# Patient Record
Sex: FEMALE | Race: WHITE | NOT HISPANIC OR LATINO | URBAN - METROPOLITAN AREA
[De-identification: names, ages, dates, MRNs, and addresses within clinical notes are randomized per-mention and may not be internally consistent; named-entity substitution may affect disease eponyms.]

---

## 2021-10-01 ENCOUNTER — INPATIENT (INPATIENT)
Facility: HOSPITAL | Age: 86
LOS: 5 days | Discharge: NURSING, REHAB OR LONG-TERM CARE | DRG: 689 | End: 2021-10-07
Attending: INTERNAL MEDICINE | Admitting: INTERNAL MEDICINE
Payer: MEDICAID

## 2021-10-01 VITALS
DIASTOLIC BLOOD PRESSURE: 85 MMHG | HEIGHT: 64.96 IN | WEIGHT: 132.28 LBS | SYSTOLIC BLOOD PRESSURE: 131 MMHG | TEMPERATURE: 98 F | HEART RATE: 63 BPM | RESPIRATION RATE: 18 BRPM | OXYGEN SATURATION: 95 %

## 2021-10-01 DIAGNOSIS — R41.82 ALTERED MENTAL STATUS, UNSPECIFIED: ICD-10-CM

## 2021-10-01 DIAGNOSIS — G93.40 ENCEPHALOPATHY, UNSPECIFIED: ICD-10-CM

## 2021-10-01 DIAGNOSIS — Z29.9 ENCOUNTER FOR PROPHYLACTIC MEASURES, UNSPECIFIED: ICD-10-CM

## 2021-10-01 DIAGNOSIS — N39.0 URINARY TRACT INFECTION, SITE NOT SPECIFIED: ICD-10-CM

## 2021-10-01 DIAGNOSIS — I82.90 ACUTE EMBOLISM AND THROMBOSIS OF UNSPECIFIED VEIN: ICD-10-CM

## 2021-10-01 DIAGNOSIS — I10 ESSENTIAL (PRIMARY) HYPERTENSION: ICD-10-CM

## 2021-10-01 DIAGNOSIS — F03.90 UNSPECIFIED DEMENTIA, UNSPECIFIED SEVERITY, WITHOUT BEHAVIORAL DISTURBANCE, PSYCHOTIC DISTURBANCE, MOOD DISTURBANCE, AND ANXIETY: ICD-10-CM

## 2021-10-01 DIAGNOSIS — E03.9 HYPOTHYROIDISM, UNSPECIFIED: ICD-10-CM

## 2021-10-01 LAB
ALBUMIN SERPL ELPH-MCNC: 3.1 G/DL — LOW (ref 3.5–5)
ALP SERPL-CCNC: 64 U/L — SIGNIFICANT CHANGE UP (ref 40–120)
ALT FLD-CCNC: 35 U/L DA — SIGNIFICANT CHANGE UP (ref 10–60)
ANION GAP SERPL CALC-SCNC: 9 MMOL/L — SIGNIFICANT CHANGE UP (ref 5–17)
APPEARANCE UR: CLEAR — SIGNIFICANT CHANGE UP
APTT BLD: 39.4 SEC — HIGH (ref 27.5–35.5)
AST SERPL-CCNC: 26 U/L — SIGNIFICANT CHANGE UP (ref 10–40)
BASOPHILS # BLD AUTO: 0.04 K/UL — SIGNIFICANT CHANGE UP (ref 0–0.2)
BASOPHILS NFR BLD AUTO: 0.6 % — SIGNIFICANT CHANGE UP (ref 0–2)
BILIRUB SERPL-MCNC: 0.6 MG/DL — SIGNIFICANT CHANGE UP (ref 0.2–1.2)
BILIRUB UR-MCNC: NEGATIVE — SIGNIFICANT CHANGE UP
BUN SERPL-MCNC: 31 MG/DL — HIGH (ref 7–18)
CALCIUM SERPL-MCNC: 9.2 MG/DL — SIGNIFICANT CHANGE UP (ref 8.4–10.5)
CHLORIDE SERPL-SCNC: 111 MMOL/L — HIGH (ref 96–108)
CO2 SERPL-SCNC: 24 MMOL/L — SIGNIFICANT CHANGE UP (ref 22–31)
COLOR SPEC: YELLOW — SIGNIFICANT CHANGE UP
CREAT SERPL-MCNC: 0.97 MG/DL — SIGNIFICANT CHANGE UP (ref 0.5–1.3)
DIFF PNL FLD: ABNORMAL
EOSINOPHIL # BLD AUTO: 0.01 K/UL — SIGNIFICANT CHANGE UP (ref 0–0.5)
EOSINOPHIL NFR BLD AUTO: 0.1 % — SIGNIFICANT CHANGE UP (ref 0–6)
GLUCOSE SERPL-MCNC: 116 MG/DL — HIGH (ref 70–99)
GLUCOSE UR QL: NEGATIVE — SIGNIFICANT CHANGE UP
HCT VFR BLD CALC: 35.1 % — SIGNIFICANT CHANGE UP (ref 34.5–45)
HGB BLD-MCNC: 12.1 G/DL — SIGNIFICANT CHANGE UP (ref 11.5–15.5)
IMM GRANULOCYTES NFR BLD AUTO: 0.6 % — SIGNIFICANT CHANGE UP (ref 0–1.5)
INR BLD: 1.78 RATIO — HIGH (ref 0.88–1.16)
KETONES UR-MCNC: NEGATIVE — SIGNIFICANT CHANGE UP
LACTATE SERPL-SCNC: 1 MMOL/L — SIGNIFICANT CHANGE UP (ref 0.7–2)
LEUKOCYTE ESTERASE UR-ACNC: NEGATIVE — SIGNIFICANT CHANGE UP
LYMPHOCYTES # BLD AUTO: 1.11 K/UL — SIGNIFICANT CHANGE UP (ref 1–3.3)
LYMPHOCYTES # BLD AUTO: 15.7 % — SIGNIFICANT CHANGE UP (ref 13–44)
MCHC RBC-ENTMCNC: 30.2 PG — SIGNIFICANT CHANGE UP (ref 27–34)
MCHC RBC-ENTMCNC: 34.5 GM/DL — SIGNIFICANT CHANGE UP (ref 32–36)
MCV RBC AUTO: 87.5 FL — SIGNIFICANT CHANGE UP (ref 80–100)
MONOCYTES # BLD AUTO: 0.49 K/UL — SIGNIFICANT CHANGE UP (ref 0–0.9)
MONOCYTES NFR BLD AUTO: 6.9 % — SIGNIFICANT CHANGE UP (ref 2–14)
NEUTROPHILS # BLD AUTO: 5.37 K/UL — SIGNIFICANT CHANGE UP (ref 1.8–7.4)
NEUTROPHILS NFR BLD AUTO: 76.1 % — SIGNIFICANT CHANGE UP (ref 43–77)
NITRITE UR-MCNC: POSITIVE
NRBC # BLD: 0 /100 WBCS — SIGNIFICANT CHANGE UP (ref 0–0)
PH UR: 5 — SIGNIFICANT CHANGE UP (ref 5–8)
PLATELET # BLD AUTO: 266 K/UL — SIGNIFICANT CHANGE UP (ref 150–400)
POTASSIUM SERPL-MCNC: 3.6 MMOL/L — SIGNIFICANT CHANGE UP (ref 3.5–5.3)
POTASSIUM SERPL-SCNC: 3.6 MMOL/L — SIGNIFICANT CHANGE UP (ref 3.5–5.3)
PROT SERPL-MCNC: 7.2 G/DL — SIGNIFICANT CHANGE UP (ref 6–8.3)
PROT UR-MCNC: 30 MG/DL
PROTHROM AB SERPL-ACNC: 20.6 SEC — HIGH (ref 10.6–13.6)
RAPID RVP RESULT: SIGNIFICANT CHANGE UP
RBC # BLD: 4.01 M/UL — SIGNIFICANT CHANGE UP (ref 3.8–5.2)
RBC # FLD: 12.9 % — SIGNIFICANT CHANGE UP (ref 10.3–14.5)
SARS-COV-2 RNA SPEC QL NAA+PROBE: SIGNIFICANT CHANGE UP
SODIUM SERPL-SCNC: 144 MMOL/L — SIGNIFICANT CHANGE UP (ref 135–145)
SP GR SPEC: 1.02 — SIGNIFICANT CHANGE UP (ref 1.01–1.02)
UROBILINOGEN FLD QL: NEGATIVE — SIGNIFICANT CHANGE UP
WBC # BLD: 7.06 K/UL — SIGNIFICANT CHANGE UP (ref 3.8–10.5)
WBC # FLD AUTO: 7.06 K/UL — SIGNIFICANT CHANGE UP (ref 3.8–10.5)

## 2021-10-01 PROCEDURE — G1004: CPT

## 2021-10-01 PROCEDURE — 71045 X-RAY EXAM CHEST 1 VIEW: CPT | Mod: 26

## 2021-10-01 PROCEDURE — 70450 CT HEAD/BRAIN W/O DYE: CPT | Mod: 26,MG

## 2021-10-01 PROCEDURE — 99284 EMERGENCY DEPT VISIT MOD MDM: CPT

## 2021-10-01 RX ORDER — LOSARTAN POTASSIUM 100 MG/1
25 TABLET, FILM COATED ORAL DAILY
Refills: 0 | Status: DISCONTINUED | OUTPATIENT
Start: 2021-10-01 | End: 2021-10-07

## 2021-10-01 RX ORDER — ACETAMINOPHEN 500 MG
650 TABLET ORAL EVERY 6 HOURS
Refills: 0 | Status: DISCONTINUED | OUTPATIENT
Start: 2021-10-01 | End: 2021-10-07

## 2021-10-01 RX ORDER — MEMANTINE HYDROCHLORIDE 10 MG/1
5 TABLET ORAL
Refills: 0 | Status: DISCONTINUED | OUTPATIENT
Start: 2021-10-01 | End: 2021-10-07

## 2021-10-01 RX ORDER — RIVAROXABAN 15 MG-20MG
1 KIT ORAL
Qty: 0 | Refills: 0 | DISCHARGE

## 2021-10-01 RX ORDER — PYRIDOXINE HCL (VITAMIN B6) 100 MG
100 TABLET ORAL DAILY
Refills: 0 | Status: DISCONTINUED | OUTPATIENT
Start: 2021-10-01 | End: 2021-10-07

## 2021-10-01 RX ORDER — AMLODIPINE BESYLATE 2.5 MG/1
1 TABLET ORAL
Qty: 0 | Refills: 0 | DISCHARGE

## 2021-10-01 RX ORDER — AMLODIPINE BESYLATE 2.5 MG/1
5 TABLET ORAL DAILY
Refills: 0 | Status: DISCONTINUED | OUTPATIENT
Start: 2021-10-01 | End: 2021-10-07

## 2021-10-01 RX ORDER — SIMVASTATIN 20 MG/1
20 TABLET, FILM COATED ORAL AT BEDTIME
Refills: 0 | Status: DISCONTINUED | OUTPATIENT
Start: 2021-10-01 | End: 2021-10-07

## 2021-10-01 RX ORDER — PYRIDOXINE HCL (VITAMIN B6) 100 MG
1 TABLET ORAL
Qty: 0 | Refills: 0 | DISCHARGE

## 2021-10-01 RX ORDER — MEMANTINE HYDROCHLORIDE 10 MG/1
1 TABLET ORAL
Qty: 0 | Refills: 0 | DISCHARGE

## 2021-10-01 RX ORDER — POLYETHYLENE GLYCOL 3350 17 G/17G
17 POWDER, FOR SOLUTION ORAL DAILY
Refills: 0 | Status: DISCONTINUED | OUTPATIENT
Start: 2021-10-01 | End: 2021-10-07

## 2021-10-01 RX ORDER — LEVOTHYROXINE SODIUM 125 MCG
1 TABLET ORAL
Qty: 0 | Refills: 0 | DISCHARGE

## 2021-10-01 RX ORDER — RIVAROXABAN 15 MG-20MG
20 KIT ORAL DAILY
Refills: 0 | Status: DISCONTINUED | OUTPATIENT
Start: 2021-10-01 | End: 2021-10-07

## 2021-10-01 RX ORDER — CEFTRIAXONE 500 MG/1
1000 INJECTION, POWDER, FOR SOLUTION INTRAMUSCULAR; INTRAVENOUS EVERY 24 HOURS
Refills: 0 | Status: DISCONTINUED | OUTPATIENT
Start: 2021-10-02 | End: 2021-10-06

## 2021-10-01 RX ORDER — INFLUENZA VIRUS VACCINE 15; 15; 15; 15 UG/.5ML; UG/.5ML; UG/.5ML; UG/.5ML
0.5 SUSPENSION INTRAMUSCULAR ONCE
Refills: 0 | Status: COMPLETED | OUTPATIENT
Start: 2021-10-01 | End: 2021-10-06

## 2021-10-01 RX ORDER — SIMVASTATIN 20 MG/1
1 TABLET, FILM COATED ORAL
Qty: 0 | Refills: 0 | DISCHARGE

## 2021-10-01 RX ORDER — IRBESARTAN 75 MG/1
1 TABLET ORAL
Qty: 0 | Refills: 0 | DISCHARGE

## 2021-10-01 RX ORDER — SENNA PLUS 8.6 MG/1
2 TABLET ORAL AT BEDTIME
Refills: 0 | Status: DISCONTINUED | OUTPATIENT
Start: 2021-10-01 | End: 2021-10-07

## 2021-10-01 RX ORDER — SENNA PLUS 8.6 MG/1
2 TABLET ORAL
Qty: 0 | Refills: 0 | DISCHARGE

## 2021-10-01 RX ORDER — RISPERIDONE 4 MG/1
0.25 TABLET ORAL AT BEDTIME
Refills: 0 | Status: DISCONTINUED | OUTPATIENT
Start: 2021-10-01 | End: 2021-10-07

## 2021-10-01 RX ORDER — CEFTRIAXONE 500 MG/1
1000 INJECTION, POWDER, FOR SOLUTION INTRAMUSCULAR; INTRAVENOUS ONCE
Refills: 0 | Status: COMPLETED | OUTPATIENT
Start: 2021-10-01 | End: 2021-10-01

## 2021-10-01 RX ORDER — RISPERIDONE 4 MG/1
1 TABLET ORAL
Qty: 0 | Refills: 0 | DISCHARGE

## 2021-10-01 RX ORDER — LEVOTHYROXINE SODIUM 125 MCG
25 TABLET ORAL DAILY
Refills: 0 | Status: DISCONTINUED | OUTPATIENT
Start: 2021-10-01 | End: 2021-10-07

## 2021-10-01 RX ORDER — ENOXAPARIN SODIUM 100 MG/ML
40 INJECTION SUBCUTANEOUS DAILY
Refills: 0 | Status: DISCONTINUED | OUTPATIENT
Start: 2021-10-01 | End: 2021-10-01

## 2021-10-01 RX ORDER — DOCUSATE SODIUM 100 MG
1 CAPSULE ORAL
Qty: 0 | Refills: 0 | DISCHARGE

## 2021-10-01 RX ADMIN — SENNA PLUS 2 TABLET(S): 8.6 TABLET ORAL at 21:35

## 2021-10-01 RX ADMIN — RISPERIDONE 0.25 MILLIGRAM(S): 4 TABLET ORAL at 21:35

## 2021-10-01 RX ADMIN — CEFTRIAXONE 100 MILLIGRAM(S): 500 INJECTION, POWDER, FOR SOLUTION INTRAMUSCULAR; INTRAVENOUS at 11:55

## 2021-10-01 RX ADMIN — SIMVASTATIN 20 MILLIGRAM(S): 20 TABLET, FILM COATED ORAL at 21:35

## 2021-10-01 RX ADMIN — LOSARTAN POTASSIUM 25 MILLIGRAM(S): 100 TABLET, FILM COATED ORAL at 18:50

## 2021-10-01 RX ADMIN — MEMANTINE HYDROCHLORIDE 5 MILLIGRAM(S): 10 TABLET ORAL at 18:50

## 2021-10-01 NOTE — H&P ADULT - PROBLEM SELECTOR PLAN 5
- Patient has history of dementia on memantine and risperidone at home  - C/w home meds  - Fall and aspiration precautions

## 2021-10-01 NOTE — H&P ADULT - PROBLEM SELECTOR PLAN 7
IMPROVE VTE Individual Risk Assessment  RISK                                                                Points  [  ] Previous VTE                                                  3  [  ] Thrombophilia                                               2  [  ] Lower limb paralysis                                      2        (unable to hold up >15 seconds)    [  ] Current Cancer                                              2         (within 6 months)  [  ] Immobilization > 24 hrs                                1  [  ] ICU/CCU stay > 24 hours                              1  [  ] Age > 60                                                      1  IMPROVE VTE Score ___2______    PPI for GI prophylaxis  On Xarelto full AC

## 2021-10-01 NOTE — ED ADULT NURSE NOTE - NSIMPLEMENTINTERV_GEN_ALL_ED
Implemented All Fall with Harm Risk Interventions:  [place] to call system. Call bell, personal items and telephone within reach. Instruct patient to call for assistance. Room bathroom lighting operational. Non-slip footwear when patient is off stretcher. Physically safe environment: no spills, clutter or unnecessary equipment. Stretcher in lowest position, wheels locked, appropriate side rails in place. Provide visual cue, wrist band, yellow gown, etc. Monitor gait and stability. Monitor for mental status changes and reorient to person, place, and time. Review medications for side effects contributing to fall risk. Reinforce activity limits and safety measures with patient and family. Provide visual clues: red socks.

## 2021-10-01 NOTE — H&P ADULT - PROBLEM SELECTOR PLAN 2
- Patient presented with change in mental status and UA +  - Started on Rocephin IV   - F/u Ucx and adjust medications as needed

## 2021-10-01 NOTE — H&P ADULT - NSHPPHYSICALEXAM_GEN_ALL_CORE
ICU Vital Signs Last 24 Hrs  T(C): 37.1 (01 Oct 2021 11:56), Max: 37.1 (01 Oct 2021 11:56)  T(F): 98.7 (01 Oct 2021 11:56), Max: 98.7 (01 Oct 2021 11:56)  HR: 71 (01 Oct 2021 11:56) (63 - 71)  BP: 144/85 (01 Oct 2021 11:56) (131/85 - 144/85)  RR: 17 (01 Oct 2021 11:56) (17 - 18)  SpO2: 97% (01 Oct 2021 11:56) (95% - 97%) ICU Vital Signs Last 24 Hrs  T(C): 37.1 (01 Oct 2021 11:56), Max: 37.1 (01 Oct 2021 11:56)  T(F): 98.7 (01 Oct 2021 11:56), Max: 98.7 (01 Oct 2021 11:56)  HR: 71 (01 Oct 2021 11:56) (63 - 71)  BP: 144/85 (01 Oct 2021 11:56) (131/85 - 144/85)  RR: 17 (01 Oct 2021 11:56) (17 - 18)  SpO2: 97% (01 Oct 2021 11:56) (95% - 97%)    GENERAL: NAD, sat well on RA, pale   HEAD:  Atraumatic, Normocephalic  EYES:  conjunctiva and sclera clear  NECK: Supple, No JVD, Normal thyroid  CHEST/LUNG: Clear to auscultation. Clear to percussion bilaterally; No rales, rhonchi, wheezing, or rubs  HEART: Regular rate and rhythm; No murmurs, rubs, or gallops  ABDOMEN: Soft, Nontender, Nondistended; Bowel sounds present, no pain or masses on palpation   NERVOUS SYSTEM:  Alert, does not follow commands, no neuro deficits   EXTREMITIES:  2+ Peripheral Pulses, No clubbing, cyanosis, or edema  : voiding well   SKIN: warm, dry

## 2021-10-01 NOTE — ED PROVIDER NOTE - CARE PLAN
Principal Discharge DX:	Altered mental status  Secondary Diagnosis:	UTI (urinary tract infection), uncomplicated   1

## 2021-10-01 NOTE — H&P ADULT - ASSESSMENT
89 year old female has past medical hx of dementia, HLD, hypothyroidism, hypertension, PE on xarelto was BIBEMS as patient was having change in mental status for one day admitted to medicine for Acute encephalopathy 2/2 UTI

## 2021-10-01 NOTE — H&P ADULT - NSICDXPASTMEDICALHX_GEN_ALL_CORE_FT
PAST MEDICAL HISTORY:  Dementia     Hyperlipidemia     Hypertension     Hypothyroidism      PAST MEDICAL HISTORY:  Dementia     Hyperlipidemia     Hypertension     Hypothyroidism     VTE (venous thromboembolism) on Xarelto

## 2021-10-01 NOTE — H&P ADULT - NSHPREVIEWOFSYSTEMS_GEN_ALL_CORE
REVIEW OF SYSTEMS:  CONSTITUTIONAL: No fever, weight loss, or fatigue  RESPIRATORY: No cough, wheezing, chills or hemoptysis; No shortness of breath  CARDIOVASCULAR: No chest pain, palpitations, dizziness, or leg swelling  GASTROINTESTINAL: No abdominal pain. No nausea, vomiting, or hematemesis; No diarrhea or constipation. No melena or hematochezia.  GENITOURINARY: No dysuria or hematuria, urinary frequency  NEUROLOGICAL: No headaches, memory loss, loss of strength, numbness, or tremors  SKIN: No itching, burning, rashes, or lesions same as above

## 2021-10-01 NOTE — H&P ADULT - CONVERSATION DETAILS
Spoke to daughter regarding GOC. Daughter Radha referred she does not want her mother to suffer and when her time comes then let her go in peace. She agreed to DNR DNI.

## 2021-10-01 NOTE — ED PROVIDER NOTE - OBJECTIVE STATEMENT
89 y/o female with PMHx of HTN, HLD, hypothyroid, and dementia presents with worsening mental status.  As per daughter whom is at bedside, the patient has been more aggressive for the past week and sometimes does not recognize daughter.  pt had lab work done yesterday and was found to have a UTI.  pt sent for admission for possible uro sepsis.

## 2021-10-01 NOTE — ED PROVIDER NOTE - CLINICAL SUMMARY MEDICAL DECISION MAKING FREE TEXT BOX
pt sent to ED for worsening mental status.  pt with baseline dementia.  Will check labs, UA, head CT, and reassess.

## 2021-10-01 NOTE — H&P ADULT - PROBLEM SELECTOR PLAN 1
- Patient admitted for acute encephalopathy most likely 2/2 UTI vs worsening dementia   - CT Head showed No hydrocephalus, acute intracranial hemorrhage, mass effect, or brain edema. 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  - Fall precautions/Aspiration precautions  - Will start Rocephin for UTI  - F/u Bcx, Ucx  - Monitor for mental status changes

## 2021-10-01 NOTE — H&P ADULT - HISTORY OF PRESENT ILLNESS
89 year old female from home, having difficulty ambulating at home, has past medical hx of dementia, hypothyroidism, HLD, hypertension was BIBEMS as patient was having change in mental status for one day and for positive UA concern for UTI done at home during home visit. Story obtained from daughter Radha as patient is unable to respond to questions due to dementia. As per daughter, no fevers, chills, cough, sob, chest pain or palpitations.     GOC DNR DNI  89 year old female from home, having difficulty ambulating at home, has past medical hx of dementia, hypothyroidism, HLD, hypertension, PE on xarelto was BIBEMS as patient was having change in mental status for one day and for positive UA concern for UTI done at home during home visit. Story obtained from daughter Radha as patient is unable to respond to questions due to dementia. As per daughter, no fevers, chills, cough, sob, chest pain or palpitations.     GOC DNR DNI

## 2021-10-02 LAB
A1C WITH ESTIMATED AVERAGE GLUCOSE RESULT: 5.7 % — HIGH (ref 4–5.6)
ANION GAP SERPL CALC-SCNC: 13 MMOL/L — SIGNIFICANT CHANGE UP (ref 5–17)
BUN SERPL-MCNC: 22 MG/DL — HIGH (ref 7–18)
CALCIUM SERPL-MCNC: 9.7 MG/DL — SIGNIFICANT CHANGE UP (ref 8.4–10.5)
CHLORIDE SERPL-SCNC: 108 MMOL/L — SIGNIFICANT CHANGE UP (ref 96–108)
CHOLEST SERPL-MCNC: 167 MG/DL — SIGNIFICANT CHANGE UP
CO2 SERPL-SCNC: 24 MMOL/L — SIGNIFICANT CHANGE UP (ref 22–31)
COVID-19 SPIKE DOMAIN AB INTERP: POSITIVE
COVID-19 SPIKE DOMAIN ANTIBODY RESULT: 200 U/ML — HIGH
CREAT SERPL-MCNC: 0.98 MG/DL — SIGNIFICANT CHANGE UP (ref 0.5–1.3)
ESTIMATED AVERAGE GLUCOSE: 117 MG/DL — HIGH (ref 68–114)
GLUCOSE SERPL-MCNC: 120 MG/DL — HIGH (ref 70–99)
HCT VFR BLD CALC: 38.4 % — SIGNIFICANT CHANGE UP (ref 34.5–45)
HDLC SERPL-MCNC: 54 MG/DL — SIGNIFICANT CHANGE UP
HGB BLD-MCNC: 13.3 G/DL — SIGNIFICANT CHANGE UP (ref 11.5–15.5)
LIPID PNL WITH DIRECT LDL SERPL: 88 MG/DL — SIGNIFICANT CHANGE UP
MAGNESIUM SERPL-MCNC: 1.9 MG/DL — SIGNIFICANT CHANGE UP (ref 1.6–2.6)
MCHC RBC-ENTMCNC: 29.4 PG — SIGNIFICANT CHANGE UP (ref 27–34)
MCHC RBC-ENTMCNC: 34.6 GM/DL — SIGNIFICANT CHANGE UP (ref 32–36)
MCV RBC AUTO: 85 FL — SIGNIFICANT CHANGE UP (ref 80–100)
NON HDL CHOLESTEROL: 113 MG/DL — SIGNIFICANT CHANGE UP
NRBC # BLD: 0 /100 WBCS — SIGNIFICANT CHANGE UP (ref 0–0)
PHOSPHATE SERPL-MCNC: 3.5 MG/DL — SIGNIFICANT CHANGE UP (ref 2.5–4.5)
PLATELET # BLD AUTO: 307 K/UL — SIGNIFICANT CHANGE UP (ref 150–400)
POTASSIUM SERPL-MCNC: 3.3 MMOL/L — LOW (ref 3.5–5.3)
POTASSIUM SERPL-SCNC: 3.3 MMOL/L — LOW (ref 3.5–5.3)
RBC # BLD: 4.52 M/UL — SIGNIFICANT CHANGE UP (ref 3.8–5.2)
RBC # FLD: 12.6 % — SIGNIFICANT CHANGE UP (ref 10.3–14.5)
SARS-COV-2 IGG+IGM SERPL QL IA: 200 U/ML — HIGH
SARS-COV-2 IGG+IGM SERPL QL IA: POSITIVE
SODIUM SERPL-SCNC: 145 MMOL/L — SIGNIFICANT CHANGE UP (ref 135–145)
TRIGL SERPL-MCNC: 125 MG/DL — SIGNIFICANT CHANGE UP
TSH SERPL-MCNC: 4.06 UU/ML — SIGNIFICANT CHANGE UP (ref 0.34–4.82)
WBC # BLD: 7.35 K/UL — SIGNIFICANT CHANGE UP (ref 3.8–10.5)
WBC # FLD AUTO: 7.35 K/UL — SIGNIFICANT CHANGE UP (ref 3.8–10.5)

## 2021-10-02 RX ORDER — POTASSIUM CHLORIDE 20 MEQ
40 PACKET (EA) ORAL ONCE
Refills: 0 | Status: COMPLETED | OUTPATIENT
Start: 2021-10-02 | End: 2021-10-02

## 2021-10-02 RX ADMIN — MEMANTINE HYDROCHLORIDE 5 MILLIGRAM(S): 10 TABLET ORAL at 05:46

## 2021-10-02 RX ADMIN — POLYETHYLENE GLYCOL 3350 17 GRAM(S): 17 POWDER, FOR SOLUTION ORAL at 11:33

## 2021-10-02 RX ADMIN — CEFTRIAXONE 100 MILLIGRAM(S): 500 INJECTION, POWDER, FOR SOLUTION INTRAMUSCULAR; INTRAVENOUS at 12:13

## 2021-10-02 RX ADMIN — RISPERIDONE 0.25 MILLIGRAM(S): 4 TABLET ORAL at 21:21

## 2021-10-02 RX ADMIN — SENNA PLUS 2 TABLET(S): 8.6 TABLET ORAL at 21:21

## 2021-10-02 RX ADMIN — AMLODIPINE BESYLATE 5 MILLIGRAM(S): 2.5 TABLET ORAL at 05:46

## 2021-10-02 RX ADMIN — RIVAROXABAN 20 MILLIGRAM(S): KIT at 11:32

## 2021-10-02 RX ADMIN — SIMVASTATIN 20 MILLIGRAM(S): 20 TABLET, FILM COATED ORAL at 21:21

## 2021-10-02 RX ADMIN — LOSARTAN POTASSIUM 25 MILLIGRAM(S): 100 TABLET, FILM COATED ORAL at 05:46

## 2021-10-02 RX ADMIN — Medication 100 MILLIGRAM(S): at 11:32

## 2021-10-02 RX ADMIN — MEMANTINE HYDROCHLORIDE 5 MILLIGRAM(S): 10 TABLET ORAL at 17:10

## 2021-10-02 RX ADMIN — Medication 25 MICROGRAM(S): at 05:46

## 2021-10-02 RX ADMIN — Medication 40 MILLIEQUIVALENT(S): at 11:32

## 2021-10-02 NOTE — PROGRESS NOTE ADULT - SUBJECTIVE AND OBJECTIVE BOX
INTERVAL HPI/OVERNIGHT EVENTS:  Patient seen,no acute dystress  VITAL SIGNS:  T(F): 97.7 (10-02-21 @ 14:24)  HR: 74 (10-02-21 @ 14:24)  BP: 138/85 (10-02-21 @ 14:24)  RR: 18 (10-02-21 @ 14:24)  SpO2: 96% (10-02-21 @ 14:24)  Wt(kg): --    PHYSICAL EXAM:  awake  Constitutional:  Eyes:  ENMT:perrla  Neck:  Respiratory:few rales  Cardiovascular:s1s2,m-none  Gastrointestinal:soft,bs pos  Extremities:  Vascular:  Neurological:no focal deficit  Musculoskeletal:    MEDICATIONS  (STANDING):  amLODIPine   Tablet 5 milliGRAM(s) Oral daily  cefTRIAXone   IVPB 1000 milliGRAM(s) IV Intermittent every 24 hours  influenza   Vaccine 0.5 milliLiter(s) IntraMuscular once  levothyroxine 25 MICROGram(s) Oral daily  losartan 25 milliGRAM(s) Oral daily  memantine 5 milliGRAM(s) Oral two times a day  polyethylene glycol 3350 17 Gram(s) Oral daily  pyridoxine 100 milliGRAM(s) Oral daily  risperiDONE   Tablet 0.25 milliGRAM(s) Oral at bedtime  rivaroxaban 20 milliGRAM(s) Oral daily  senna 2 Tablet(s) Oral at bedtime  simvastatin 20 milliGRAM(s) Oral at bedtime    MEDICATIONS  (PRN):  acetaminophen   Tablet .. 650 milliGRAM(s) Oral every 6 hours PRN Temp greater or equal to 38C (100.4F), Mild Pain (1 - 3)      Allergies    No Known Allergies    Intolerances        LABS:                        13.3   7.35  )-----------( 307      ( 02 Oct 2021 07:17 )             38.4     10-    145  |  108  |  22<H>  ----------------------------<  120<H>  3.3<L>   |  24  |  0.98    Ca    9.7      02 Oct 2021 07:17  Phos  3.5     10-  Mg     1.9     10-    TPro  7.2  /  Alb  3.1<L>  /  TBili  0.6  /  DBili  x   /  AST  26  /  ALT  35  /  AlkPhos  64  10-    PT/INR - ( 01 Oct 2021 11:02 )   PT: 20.6 sec;   INR: 1.78 ratio         PTT - ( 01 Oct 2021 11:02 )  PTT:39.4 sec  Urinalysis Basic - ( 01 Oct 2021 11:02 )    Color: Yellow / Appearance: Clear / S.020 / pH: x  Gluc: x / Ketone: Negative  / Bili: Negative / Urobili: Negative   Blood: x / Protein: 30 mg/dL / Nitrite: Positive   Leuk Esterase: Negative / RBC: 2-5 /HPF / WBC 6-10 /HPF   Sq Epi: x / Non Sq Epi: x / Bacteria: TNTC /HPF        RADIOLOGY & ADDITIONAL TESTS:      Assessment and Plan:    Assessment:  · Assessment	  89 year old female has past medical hx of dementia, HLD, hypothyroidism, hypertension, PE on xarelto was BIBEMS as patient was having change in mental status for one day admitted to medicine for Acute encephalopathy 2/2 UTI       Problem/Plan - 1:  ·  Problem: Acute encephalopathy   ·  Plan: - Patient admitted for acute encephalopathy most likely 2/2 UTI vs worsening dementia   - Fall precautions/Aspiration precautions  - Will start Rocephin for UTI  - F/u Bcx, Ucx  - Monitor for mental status changes.     Problem/Plan - 2:  ·  Problem: Acute UTI.   ·  Plan: - Patient presented with change in mental status and UA +  - Started on Rocephin IV   - F/u Ucx and adjust medications as needed.     Problem/Plan - 3:  ·  Problem: Hypothyroidism.   ·  Plan: - Patient with hx of hypothyroidism on Synthroid at home   - C/w home meds  - F/u TSH and adjust medications if needed.     Problem/Plan - 4:  ·  Problem: Hypertension.   ·  Plan: - Patient has history of Hypertension on Irbesartan and amlodipine at home   - C/w home meds with parameters- losartan is equivalent to Irbesartan   - DASH diet  - Monitor BP and adjust meds as needed.     Problem/Plan - 5:  ·  Problem: Dementia.   ·  Plan: - Patient has history of dementia on memantine and risperidone at home  - C/w home meds  - Fall and aspiration precautions.     Problem/Plan - 6:  ·  Problem: VTE (venous thromboembolism).   ·  Plan: - Patient has hx of PE on Xarelto at home  - C/w home meds.     Problem/Plan - 7:  ·  Problem: Prophylactic measure.   ·  Plan: IMPROVE VTE Individual Risk Assessment  RISK                                                                Points  [  ] Previous VTE                                                  3  [  ] Thrombophilia                                               2  [  ] Lower limb paralysis                                      2        (unable to hold up >15 seconds)    [  ] Current Cancer                                              2         (within 6 months)  [  ] Immobilization > 24 hrs                                1  [  ] ICU/CCU stay > 24 hours                              1  [  ] Age > 60                                                      1  IMPROVE VTE Score ___2______    PPI for GI prophylaxis  On Xarelto full AC.

## 2021-10-02 NOTE — PHYSICAL THERAPY INITIAL EVALUATION ADULT - GAIT DEVIATIONS NOTED, PT EVAL
decreased kushal/increased time in double stance/decreased velocity of limb motion/decreased step length

## 2021-10-02 NOTE — PHYSICAL THERAPY INITIAL EVALUATION ADULT - PERTINENT HX OF CURRENT PROBLEM, REHAB EVAL
Pt admitted from home for evaluation of AMS and difficulty walking. head CT negative for acute findings

## 2021-10-02 NOTE — PHYSICAL THERAPY INITIAL EVALUATION ADULT - CRITERIA FOR SKILLED THERAPEUTIC INTERVENTIONS
PAO pending progress and tolerance to PT/impairments found/functional limitations in following categories/risk reduction/prevention/anticipated discharge recommendation

## 2021-10-02 NOTE — PHYSICAL THERAPY INITIAL EVALUATION ADULT - GENERAL OBSERVATIONS, REHAB EVAL
Pt seen supine in bed, denied any c/o pain/discomfort, was cooperative during assessment. [place]  #[phone] assisted with translation

## 2021-10-03 LAB
ANION GAP SERPL CALC-SCNC: 9 MMOL/L — SIGNIFICANT CHANGE UP (ref 5–17)
BUN SERPL-MCNC: 36 MG/DL — HIGH (ref 7–18)
CALCIUM SERPL-MCNC: 9.5 MG/DL — SIGNIFICANT CHANGE UP (ref 8.4–10.5)
CHLORIDE SERPL-SCNC: 110 MMOL/L — HIGH (ref 96–108)
CO2 SERPL-SCNC: 26 MMOL/L — SIGNIFICANT CHANGE UP (ref 22–31)
CREAT SERPL-MCNC: 1.21 MG/DL — SIGNIFICANT CHANGE UP (ref 0.5–1.3)
GLUCOSE SERPL-MCNC: 123 MG/DL — HIGH (ref 70–99)
HCT VFR BLD CALC: 33.6 % — LOW (ref 34.5–45)
HGB BLD-MCNC: 12 G/DL — SIGNIFICANT CHANGE UP (ref 11.5–15.5)
MAGNESIUM SERPL-MCNC: 2.1 MG/DL — SIGNIFICANT CHANGE UP (ref 1.6–2.6)
MCHC RBC-ENTMCNC: 30.8 PG — SIGNIFICANT CHANGE UP (ref 27–34)
MCHC RBC-ENTMCNC: 35.7 GM/DL — SIGNIFICANT CHANGE UP (ref 32–36)
MCV RBC AUTO: 86.2 FL — SIGNIFICANT CHANGE UP (ref 80–100)
NRBC # BLD: 0 /100 WBCS — SIGNIFICANT CHANGE UP (ref 0–0)
PHOSPHATE SERPL-MCNC: 4.8 MG/DL — HIGH (ref 2.5–4.5)
PLATELET # BLD AUTO: 268 K/UL — SIGNIFICANT CHANGE UP (ref 150–400)
POTASSIUM SERPL-MCNC: 3.5 MMOL/L — SIGNIFICANT CHANGE UP (ref 3.5–5.3)
POTASSIUM SERPL-SCNC: 3.5 MMOL/L — SIGNIFICANT CHANGE UP (ref 3.5–5.3)
RBC # BLD: 3.9 M/UL — SIGNIFICANT CHANGE UP (ref 3.8–5.2)
RBC # FLD: 12.9 % — SIGNIFICANT CHANGE UP (ref 10.3–14.5)
SODIUM SERPL-SCNC: 145 MMOL/L — SIGNIFICANT CHANGE UP (ref 135–145)
WBC # BLD: 6.81 K/UL — SIGNIFICANT CHANGE UP (ref 3.8–10.5)
WBC # FLD AUTO: 6.81 K/UL — SIGNIFICANT CHANGE UP (ref 3.8–10.5)

## 2021-10-03 RX ADMIN — RISPERIDONE 0.25 MILLIGRAM(S): 4 TABLET ORAL at 21:55

## 2021-10-03 RX ADMIN — MEMANTINE HYDROCHLORIDE 5 MILLIGRAM(S): 10 TABLET ORAL at 05:47

## 2021-10-03 RX ADMIN — CEFTRIAXONE 100 MILLIGRAM(S): 500 INJECTION, POWDER, FOR SOLUTION INTRAMUSCULAR; INTRAVENOUS at 12:13

## 2021-10-03 RX ADMIN — MEMANTINE HYDROCHLORIDE 5 MILLIGRAM(S): 10 TABLET ORAL at 17:13

## 2021-10-03 RX ADMIN — SENNA PLUS 2 TABLET(S): 8.6 TABLET ORAL at 21:55

## 2021-10-03 RX ADMIN — LOSARTAN POTASSIUM 25 MILLIGRAM(S): 100 TABLET, FILM COATED ORAL at 05:47

## 2021-10-03 RX ADMIN — RIVAROXABAN 20 MILLIGRAM(S): KIT at 11:14

## 2021-10-03 RX ADMIN — SIMVASTATIN 20 MILLIGRAM(S): 20 TABLET, FILM COATED ORAL at 21:56

## 2021-10-03 RX ADMIN — POLYETHYLENE GLYCOL 3350 17 GRAM(S): 17 POWDER, FOR SOLUTION ORAL at 11:14

## 2021-10-03 RX ADMIN — Medication 100 MILLIGRAM(S): at 11:14

## 2021-10-03 RX ADMIN — Medication 25 MICROGRAM(S): at 05:47

## 2021-10-03 RX ADMIN — AMLODIPINE BESYLATE 5 MILLIGRAM(S): 2.5 TABLET ORAL at 05:47

## 2021-10-03 NOTE — PROGRESS NOTE ADULT - SUBJECTIVE AND OBJECTIVE BOX
INTERVAL HPI/OVERNIGHT EVENTS:  Patient seen,events noticed,awake,confused  VITAL SIGNS:  T(F): 97.3 (10-03-21 @ 06:33)  HR: 94 (10-03-21 @ 06:33)  BP: 113/76 (10-03-21 @ 06:33)  RR: 19 (10-03-21 @ 06:33)  SpO2: 97% (10-03-21 @ 06:33)  Wt(kg): --    PHYSICAL EXAM:  awake  Constitutional:  Eyes:  ENMT:perrla  Neck:  Respiratory:clear  Cardiovascular:s1s2,m-none  Gastrointestinal:soft,bs pos  Extremities:  Vascular:  Neurological:no focal deficit  Musculoskeletal:    MEDICATIONS  (STANDING):  amLODIPine   Tablet 5 milliGRAM(s) Oral daily  cefTRIAXone   IVPB 1000 milliGRAM(s) IV Intermittent every 24 hours  influenza   Vaccine 0.5 milliLiter(s) IntraMuscular once  levothyroxine 25 MICROGram(s) Oral daily  losartan 25 milliGRAM(s) Oral daily  memantine 5 milliGRAM(s) Oral two times a day  polyethylene glycol 3350 17 Gram(s) Oral daily  pyridoxine 100 milliGRAM(s) Oral daily  risperiDONE   Tablet 0.25 milliGRAM(s) Oral at bedtime  rivaroxaban 20 milliGRAM(s) Oral daily  senna 2 Tablet(s) Oral at bedtime  simvastatin 20 milliGRAM(s) Oral at bedtime    MEDICATIONS  (PRN):  acetaminophen   Tablet .. 650 milliGRAM(s) Oral every 6 hours PRN Temp greater or equal to 38C (100.4F), Mild Pain (1 - 3)      Allergies    No Known Allergies    Intolerances        LABS:                        12.0   6.81  )-----------( 268      ( 03 Oct 2021 07:27 )             33.6     10-03    145  |  110<H>  |  36<H>  ----------------------------<  123<H>  3.5   |  26  |  1.21    Ca    9.5      03 Oct 2021 07:27  Phos  4.8     10-03  Mg     2.1     10-03            RADIOLOGY & ADDITIONAL TESTS:      Assessment and Plan:    Assessment:  · Assessment	  89 year old female has past medical hx of dementia, HLD, hypothyroidism, hypertension, PE on xarelto was BIBEMS as patient was having change in mental status for one day admitted to medicine for Acute encephalopathy 2/2 UTI       Problem/Plan - 1:  ·  Problem: Acute metabolic  encephalopathy. sec to UTI  - Fall precautions/Aspiration precautions  - cont Rocephin for UTI  - F/u Bcx, Ucx  - Monitor for mental status changes.     Problem/Plan - 2:  ·  Problem: Acute UTI.   ·  Plan: - Patient presented with change in mental status and UA +  - Started on Rocephin IV   - F/u Ucx and adjust medications as needed.     Problem/Plan - 3:  ·  Problem: Hypothyroidism.   ·  Plan: - Patient with hx of hypothyroidism on Synthroid at home   - C/w home meds  - F/u TSH and adjust medications if needed.     Problem/Plan - 4:  ·  Problem: Hypertension.   ·  Plan: - Patient has history of Hypertension on Irbesartan and amlodipine at home   - C/w home meds with parameters- losartan is equivalent to Irbesartan   - DASH diet  - Monitor BP and adjust meds as needed.     Problem/Plan - 5:  ·  Problem: Dementia.   ·  Plan: - Patient has history of dementia on memantine and risperidone at home  - C/w home meds  - Fall and aspiration precautions.     Problem/Plan - 6:  ·  Problem: VTE (venous thromboembolism).   ·  Plan: - Patient has hx of PE on Xarelto at home  - C/w home meds.     Problem/Plan - 7:  ·  Problem: Prophylactic measure.   ·  Plan: IMPROVE VTE Individual Risk Assessment  RISK                                                                Points  [  ] Previous VTE                                                  3  [  ] Thrombophilia                                               2  [  ] Lower limb paralysis                                      2        (unable to hold up >15 seconds)    [  ] Current Cancer                                              2         (within 6 months)  [  ] Immobilization > 24 hrs                                1  [  ] ICU/CCU stay > 24 hours                              1  [  ] Age > 60                                                      1  IMPROVE VTE Score ___2______    PPI for GI prophylaxis  On Xarelto full AC.

## 2021-10-04 DIAGNOSIS — N39.0 URINARY TRACT INFECTION, SITE NOT SPECIFIED: ICD-10-CM

## 2021-10-04 DIAGNOSIS — Z02.9 ENCOUNTER FOR ADMINISTRATIVE EXAMINATIONS, UNSPECIFIED: ICD-10-CM

## 2021-10-04 LAB
-  AMIKACIN: SIGNIFICANT CHANGE UP
-  AMOXICILLIN/CLAVULANIC ACID: SIGNIFICANT CHANGE UP
-  AMPICILLIN/SULBACTAM: SIGNIFICANT CHANGE UP
-  AMPICILLIN: SIGNIFICANT CHANGE UP
-  AZTREONAM: SIGNIFICANT CHANGE UP
-  CEFAZOLIN: SIGNIFICANT CHANGE UP
-  CEFEPIME: SIGNIFICANT CHANGE UP
-  CEFOXITIN: SIGNIFICANT CHANGE UP
-  CEFTRIAXONE: SIGNIFICANT CHANGE UP
-  CIPROFLOXACIN: SIGNIFICANT CHANGE UP
-  ERTAPENEM: SIGNIFICANT CHANGE UP
-  GENTAMICIN: SIGNIFICANT CHANGE UP
-  IMIPENEM: SIGNIFICANT CHANGE UP
-  LEVOFLOXACIN: SIGNIFICANT CHANGE UP
-  MEROPENEM: SIGNIFICANT CHANGE UP
-  NITROFURANTOIN: SIGNIFICANT CHANGE UP
-  PIPERACILLIN/TAZOBACTAM: SIGNIFICANT CHANGE UP
-  TIGECYCLINE: SIGNIFICANT CHANGE UP
-  TOBRAMYCIN: SIGNIFICANT CHANGE UP
-  TRIMETHOPRIM/SULFAMETHOXAZOLE: SIGNIFICANT CHANGE UP
ANION GAP SERPL CALC-SCNC: 11 MMOL/L — SIGNIFICANT CHANGE UP (ref 5–17)
BUN SERPL-MCNC: 40 MG/DL — HIGH (ref 7–18)
CALCIUM SERPL-MCNC: 9.1 MG/DL — SIGNIFICANT CHANGE UP (ref 8.4–10.5)
CHLORIDE SERPL-SCNC: 108 MMOL/L — SIGNIFICANT CHANGE UP (ref 96–108)
CO2 SERPL-SCNC: 26 MMOL/L — SIGNIFICANT CHANGE UP (ref 22–31)
CREAT SERPL-MCNC: 1.11 MG/DL — SIGNIFICANT CHANGE UP (ref 0.5–1.3)
CULTURE RESULTS: SIGNIFICANT CHANGE UP
GLUCOSE SERPL-MCNC: 137 MG/DL — HIGH (ref 70–99)
HCT VFR BLD CALC: 35.4 % — SIGNIFICANT CHANGE UP (ref 34.5–45)
HGB BLD-MCNC: 12.4 G/DL — SIGNIFICANT CHANGE UP (ref 11.5–15.5)
MAGNESIUM SERPL-MCNC: 1.8 MG/DL — SIGNIFICANT CHANGE UP (ref 1.6–2.6)
MCHC RBC-ENTMCNC: 30.5 PG — SIGNIFICANT CHANGE UP (ref 27–34)
MCHC RBC-ENTMCNC: 35 GM/DL — SIGNIFICANT CHANGE UP (ref 32–36)
MCV RBC AUTO: 87.2 FL — SIGNIFICANT CHANGE UP (ref 80–100)
METHOD TYPE: SIGNIFICANT CHANGE UP
NRBC # BLD: 0 /100 WBCS — SIGNIFICANT CHANGE UP (ref 0–0)
ORGANISM # SPEC MICROSCOPIC CNT: SIGNIFICANT CHANGE UP
ORGANISM # SPEC MICROSCOPIC CNT: SIGNIFICANT CHANGE UP
PHOSPHATE SERPL-MCNC: 3.7 MG/DL — SIGNIFICANT CHANGE UP (ref 2.5–4.5)
PLATELET # BLD AUTO: 300 K/UL — SIGNIFICANT CHANGE UP (ref 150–400)
POTASSIUM SERPL-MCNC: 3.5 MMOL/L — SIGNIFICANT CHANGE UP (ref 3.5–5.3)
POTASSIUM SERPL-SCNC: 3.5 MMOL/L — SIGNIFICANT CHANGE UP (ref 3.5–5.3)
RBC # BLD: 4.06 M/UL — SIGNIFICANT CHANGE UP (ref 3.8–5.2)
RBC # FLD: 13 % — SIGNIFICANT CHANGE UP (ref 10.3–14.5)
SODIUM SERPL-SCNC: 145 MMOL/L — SIGNIFICANT CHANGE UP (ref 135–145)
SPECIMEN SOURCE: SIGNIFICANT CHANGE UP
WBC # BLD: 8.58 K/UL — SIGNIFICANT CHANGE UP (ref 3.8–10.5)
WBC # FLD AUTO: 8.58 K/UL — SIGNIFICANT CHANGE UP (ref 3.8–10.5)

## 2021-10-04 RX ORDER — PANTOPRAZOLE SODIUM 20 MG/1
40 TABLET, DELAYED RELEASE ORAL
Refills: 0 | Status: DISCONTINUED | OUTPATIENT
Start: 2021-10-04 | End: 2021-10-07

## 2021-10-04 RX ADMIN — CEFTRIAXONE 100 MILLIGRAM(S): 500 INJECTION, POWDER, FOR SOLUTION INTRAMUSCULAR; INTRAVENOUS at 11:31

## 2021-10-04 RX ADMIN — Medication 25 MICROGRAM(S): at 05:20

## 2021-10-04 RX ADMIN — AMLODIPINE BESYLATE 5 MILLIGRAM(S): 2.5 TABLET ORAL at 05:20

## 2021-10-04 RX ADMIN — SIMVASTATIN 20 MILLIGRAM(S): 20 TABLET, FILM COATED ORAL at 21:50

## 2021-10-04 RX ADMIN — RIVAROXABAN 20 MILLIGRAM(S): KIT at 11:31

## 2021-10-04 RX ADMIN — SENNA PLUS 2 TABLET(S): 8.6 TABLET ORAL at 21:50

## 2021-10-04 RX ADMIN — RISPERIDONE 0.25 MILLIGRAM(S): 4 TABLET ORAL at 21:50

## 2021-10-04 RX ADMIN — MEMANTINE HYDROCHLORIDE 5 MILLIGRAM(S): 10 TABLET ORAL at 05:20

## 2021-10-04 RX ADMIN — Medication 100 MILLIGRAM(S): at 11:31

## 2021-10-04 RX ADMIN — POLYETHYLENE GLYCOL 3350 17 GRAM(S): 17 POWDER, FOR SOLUTION ORAL at 11:31

## 2021-10-04 RX ADMIN — MEMANTINE HYDROCHLORIDE 5 MILLIGRAM(S): 10 TABLET ORAL at 17:11

## 2021-10-04 RX ADMIN — LOSARTAN POTASSIUM 25 MILLIGRAM(S): 100 TABLET, FILM COATED ORAL at 05:20

## 2021-10-04 NOTE — PROGRESS NOTE ADULT - PROBLEM SELECTOR PLAN 8
Pending Urine culture sensitivities; d/w core lab final result expected at approx 10 pm tonight  PT recommend PAO   Covid negative 10/1

## 2021-10-04 NOTE — DISCHARGE NOTE PROVIDER - NSDCFUADDINST_GEN_ALL_CORE_FT
Dysphagia 3 soft diet with thin liquids   Maintain Aspiration Precautions     Activity as tolerated with assistance

## 2021-10-04 NOTE — SWALLOW BEDSIDE ASSESSMENT ADULT - SWALLOW EVAL: DIAGNOSIS
Pt spat out trials of ice chips and refused trials of mechanical soft diet and thin liquids. Functional oropharyngeal swallow on trials of nectar thick liquids and puree.

## 2021-10-04 NOTE — PROGRESS NOTE ADULT - ASSESSMENT
89 y/o F w/ Pmhx of Dementia, HLD, hypothyroidism, hypertension, PE on xarelto was BIBEMS as patient was having change in mental status for one day admitted to medicine for Acute encephalopathy 2/2 UTI; urine culture positive for Ecoli; continued on Rocephin pending sensitivities.

## 2021-10-04 NOTE — SWALLOW BEDSIDE ASSESSMENT ADULT - SWALLOW EVAL: PROGNOSIS
pt comes to ED by EMS for mechanical fall pt tripped down 6 steps pt hit back on her last step. pt is complaining of back and leg pain. pt denies any CP or dizziness prior to falling. pt denies LOC or hitting her head. pt denies being on any blood thinners or PMHX. pt VSS
[place]

## 2021-10-04 NOTE — PROGRESS NOTE ADULT - PROBLEM SELECTOR PLAN 1
- Patient admitted for acute encephalopathy most likely 2/2 UTI vs worsening dementia   - CT Head showed No hydrocephalus, acute intracranial hemorrhage, mass effect, or brain edema. 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  - Fall precautions/Aspiration precautions  - Continue rocephin  forUTI  - Prelim blood culture NGTD  - Urine cx prelim Ecoli; pending sensitivities.   - Continue to monitor mental status, wbc, vitals

## 2021-10-04 NOTE — DISCHARGE NOTE PROVIDER - NSDCCPCAREPLAN_GEN_ALL_CORE_FT
PRINCIPAL DISCHARGE DIAGNOSIS  Diagnosis: Acute UTI  Assessment and Plan of Treatment: Patient to take all antibiotics as prescribed and follow up with her healthcare provider in one week. Call for an appointment. If she develops a fever (temp > 101F), burning on urination, difficulty voiding, call your healthcare provider.      SECONDARY DISCHARGE DIAGNOSES  Diagnosis: Acute encephalopathy  Assessment and Plan of Treatment:     Diagnosis: History of pulmonary embolism  Assessment and Plan of Treatment:     Diagnosis: Hypertension  Assessment and Plan of Treatment:     Diagnosis: Acute UTI  Assessment and Plan of Treatment:     Diagnosis: UTI (urinary tract infection), uncomplicated  Assessment and Plan of Treatment:      PRINCIPAL DISCHARGE DIAGNOSIS  Diagnosis: Acute UTI  Assessment and Plan of Treatment: Patient was treated with intravenous antibiotic therapy and she completed her treatment course while hospitalized.   - She is to follow up with her healthcare provider in one week. Call for an appointment.   -If she develops a fever (temp > 101F), burning on urination, difficulty voiding, call her healthcare provider.      SECONDARY DISCHARGE DIAGNOSES  Diagnosis: Acute encephalopathy  Assessment and Plan of Treatment: due to urinary  tract infection. now at baseline with history of dementia.    Diagnosis: History of pulmonary embolism  Assessment and Plan of Treatment: -Continue patient on Xarelto  - Maintain bleeding precautions  - Patient to follow up with her PMD for continued care    Diagnosis: Hypertension  Assessment and Plan of Treatment: Continue low salt diet  Activity as tolerated.  Continue BP medications as prescribed.  Patient to follow up with her  medical doctor for routine blood pressure monitoring at your next visit.  Notify her  doctor if she  has any of the following symptoms:   Dizziness, Lightheadedness, Blurry vision, Headache, Chest pain, Shortness of breath    Diagnosis: Dementia  Assessment and Plan of Treatment: The care of individuals with dementia is varied and dependent upon the progression of the dementia. The following suggestions are intended for the person living with, or caring for, the person with dementia.Create a safe environment.Remove the locks on bathroom doors to prevent the person from accidentally locking himself or herself in.Use childproof latches on cabinets and any place where cleaning supplies, chemicals, or alcohol are kept.Keep the house free from clutter. Remove rugs or anything that might contribute to a fall.Use night lights or dim lights at night; Have a consistent nighttime routine; limit napping during the day.Monitor chewing and swallowing ability.Only give over-the-counter or prescription edicines as directed by the caregiver.SEEK MEDICAL CARE IF:New behavioral problems start uch as moodiness, aggressiveness, or seeing things that are not there (hallucinations).Any new problem with rain function such as balance, speech, or falling a lot.Problems with swallowing develop.SEEK IMMEDIATE MEDICAL CARE IF:A fever develops.New or worsened confusion and/or sleepiness develops.       Diagnosis: UTI (urinary tract infection), uncomplicated  Assessment and Plan of Treatment:     Diagnosis: Acute UTI  Assessment and Plan of Treatment:      PRINCIPAL DISCHARGE DIAGNOSIS  Diagnosis: Acute UTI  Assessment and Plan of Treatment: Patient was treated with intravenous antibiotic therapy and she completed her treatment course while hospitalized.   - She is to follow up with her healthcare provider in one week. Call for an appointment.   -If she develops a fever (temp > 101F), burning on urination, difficulty voiding, call her healthcare provider.      SECONDARY DISCHARGE DIAGNOSES  Diagnosis: Acute encephalopathy  Assessment and Plan of Treatment: due to urinary  tract infection. now at baseline with history of dementia.    Diagnosis: History of pulmonary embolism  Assessment and Plan of Treatment: -Continue patient on Xarelto  - Maintain bleeding precautions  - Patient to follow up with her PMD for continued care    Diagnosis: Hypertension  Assessment and Plan of Treatment: Continue low salt diet  Activity as tolerated.  Continue BP medications as prescribed.  Patient to follow up with her  medical doctor for routine blood pressure monitoring at your next visit.  Notify her  doctor if she  has any of the following symptoms:   Dizziness, Lightheadedness, Blurry vision, Headache, Chest pain, Shortness of breath    Diagnosis: Dementia  Assessment and Plan of Treatment: The care of individuals with dementia is varied and dependent upon the progression of the dementia. The following suggestions are intended for the person living with, or caring for, the person with dementia.Create a safe environment.Remove the locks on bathroom doors to prevent the person from accidentally locking himself or herself in.Use childproof latches on cabinets and any place where cleaning supplies, chemicals, or alcohol are kept.Keep the house free from clutter. Remove rugs or anything that might contribute to a fall.Use night lights or dim lights at night; Have a consistent nighttime routine; limit napping during the day.Monitor chewing and swallowing ability.Only give over-the-counter or prescription edicines as directed by the caregiver.SEEK MEDICAL CARE IF:New behavioral problems start uch as moodiness, aggressiveness, or seeing things that are not there (hallucinations).Any new problem with rain function such as balance, speech, or falling a lot.Problems with swallowing develop.SEEK IMMEDIATE MEDICAL CARE IF:A fever develops.New or worsened confusion and/or sleepiness develops.

## 2021-10-04 NOTE — PROGRESS NOTE ADULT - PROBLEM SELECTOR PLAN 2
- Patient presented with change in mental status and UA +, Ucx growing Ecoli   - Continue Rocephin  - Follow up final Ucx result

## 2021-10-04 NOTE — DISCHARGE NOTE PROVIDER - HOSPITAL COURSE
89 y/o F w/ Pmhx of Dementia, HLD, hypothyroidism, hypertension, PE on xarelto was BIBEMS as patient was having change in mental status for one day admitted to medicine for Acute encephalopathy 2/2 UTI; urine culture positive for Ecoli; continued on Rocephin pending sensitivities.       ***INCOMPLETE 10/3 *** 89 y/o F w/ Pmhx of Dementia, HLD, hypothyroidism, hypertension, PE on xarelto was BIBEMS as patient was having change in mental status for one day admitted to medicine for Acute encephalopathy 2/2 UTI; urine culture positive for Ecoli; sensitive to Rocephin; completed antibiotic course inpatient. Patient evaluated by PT and recommended for rehab; CM following  for D  Discussed with Attending; patient deemed cleared for discharge with follow up as advised.     ***Note that this is a brief summary of patient's hospitalization. Refer to EMR for further details*** 89 y/o F w/ Pmhx of Dementia, HLD, hypothyroidism, hypertension, PE on xarelto was BIBEMS as patient was having change in mental status for one day admitted to medicine for Acute encephalopathy 2/2 UTI; urine culture positive for Ecoli; sensitive to Rocephin; completed antibiotic course inpatient. Patient evaluated by PT and recommended for rehab; CM following.   Discussed with Attending; patient deemed cleared for discharge with follow up as advised.     ***Note that this is a brief summary of patient's hospitalization. Refer to EMR for further details***

## 2021-10-04 NOTE — SWALLOW BEDSIDE ASSESSMENT ADULT - SLP PERTINENT HISTORY OF CURRENT PROBLEM
Pt is a 89 year old female from home, having difficulty ambulating at home, has past medical hx of dementia, hypothyroidism, HLD, hypertension, PE on xarelto was BIBEMS as patient was having change in mental status for one day and for positive UA concern for UTI done at home during home visit. Story obtained from daughter Radha as patient is unable to respond to questions due to dementia. As per daughter, no fevers, chills, cough, sob, chest pain or palpitations.

## 2021-10-04 NOTE — SWALLOW BEDSIDE ASSESSMENT ADULT - SWALLOW EVAL: RECOMMENDED DIET
Unable to make diet recommendations at this time 2/2 pt refusal to take sufficient PO trials for swallow evaluation. Therefore, will defer PO diet to medical team at this time.

## 2021-10-04 NOTE — PROGRESS NOTE ADULT - SUBJECTIVE AND OBJECTIVE BOX
NP Note discussed with  Primary Attending; Dr Madsen     Patient is a 89y old  Female who presents with a chief complaint of ACUTE ENCEPHALOPATHY (03 Oct 2021 13:09)    Patient is mainly Serbian speaking, [employer], agent [id number] assisted w/ translation    INTERVAL HPI/OVERNIGHT EVENTS: Patient seen and examined at bedside. No new complaints    MEDICATIONS  (STANDING):  amLODIPine   Tablet 5 milliGRAM(s) Oral daily  cefTRIAXone   IVPB 1000 milliGRAM(s) IV Intermittent every 24 hours  influenza   Vaccine 0.5 milliLiter(s) IntraMuscular once  levothyroxine 25 MICROGram(s) Oral daily  losartan 25 milliGRAM(s) Oral daily  memantine 5 milliGRAM(s) Oral two times a day  polyethylene glycol 3350 17 Gram(s) Oral daily  pyridoxine 100 milliGRAM(s) Oral daily  risperiDONE   Tablet 0.25 milliGRAM(s) Oral at bedtime  rivaroxaban 20 milliGRAM(s) Oral daily  senna 2 Tablet(s) Oral at bedtime  simvastatin 20 milliGRAM(s) Oral at bedtime    MEDICATIONS  (PRN):  acetaminophen   Tablet .. 650 milliGRAM(s) Oral every 6 hours PRN Temp greater or equal to 38C (100.4F), Mild Pain (1 - 3)      __________________________________________________  REVIEW OF SYSTEMS:    CONSTITUTIONAL: No fever,   RESPIRATORY: No cough; No shortness of breath  CARDIOVASCULAR: No chest pain, no palpitations  GASTROINTESTINAL: No pain. No nausea or vomiting; No diarrhea   NEUROLOGICAL: No headache or numbness, no tremors  MUSCULOSKELETAL: No joint pain, no muscle pain  GENITOURINARY: no dysuria, no frequency, no hematuria   ALL OTHER  ROS negative        Vital Signs Last 24 Hrs  T(C): 36.8 (04 Oct 2021 05:13), Max: 37 (03 Oct 2021 20:37)  T(F): 98.2 (04 Oct 2021 05:13), Max: 98.6 (03 Oct 2021 20:37)  HR: 89 (04 Oct 2021 05:13) (89 - 105)  BP: 124/82 (04 Oct 2021 05:13) (124/82 - 138/87)  BP(mean): --  RR: 18 (04 Oct 2021 05:13) (18 - 18)  SpO2: 96% (04 Oct 2021 05:13) (96% - 96%)    ________________________________________________  PHYSICAL EXAM:  GENERAL: NAD  HEENT: Normocephalic; atraumatic   CHEST/LUNG: Breathing nonlabored; clear to auscultation bilaterally  HEART: +S1 +S2  regular  ABDOMEN: Soft, Nontender, Nondistended; Bowel sounds present  EXTREMITIES: + 2 bilateral radial pulses. No edema  SKIN: warm and dry; no rash  NERVOUS SYSTEM:  Awake and alert; Oriented  to place, person  ; no new deficits    _________________________________________________  LABS:                        12.4   8.58  )-----------( 300      ( 04 Oct 2021 08:00 )               35.4     10-04    145  |  108  |  40<H>  ----------------------------<  137<H>  3.5   |  26  |  1.11    Ca    9.1      04 Oct 2021 08:00  Phos  3.7     10-04  Mg     1.8     10-04          CAPILLARY BLOOD GLUCOSE            RADIOLOGY & ADDITIONAL TESTS:    Culture - Blood (10.01.21 @ 19:04)    Specimen Source: .Blood Blood-Peripheral    Culture Results:   No growth to date.    Culture - Blood (10.01.21 @ 19:04)    Specimen Source: .Blood Blood-Peripheral    Culture Results:   No growth to date.      Culture - Urine (10.01.21 @ 18:57)    Specimen Source: Clean Catch Clean Catch (Midstream)    Culture Results:   >100,000 CFU/ml Escherichia coli

## 2021-10-04 NOTE — SWALLOW BEDSIDE ASSESSMENT ADULT - SPECIFY REASON(S)
Counseling and Referral of Other Preventative  (Italic type indicates deductible and co-insurance are waived)    Patient Name: Filipe Agustin  Today's Date: 2/5/2018    Health Maintenance       Date Due Completion Date    Zoster Vaccine 12/20/2006 ---    Colonoscopy 05/07/2017 5/7/2007 (Done)    Override on 5/7/2007: Done (Done in 2007, exact date unknown--normal per patient)    Pneumococcal (65+) (2 of 2 - PPSV23) 06/28/2017 6/28/2016    Influenza Vaccine 08/01/2017 12/20/2016    Override on 12/12/2014: Done    High Dose Statin 02/05/2019 2/5/2018    Lipid Panel 06/20/2022 6/20/2017    TETANUS VACCINE 06/28/2026 6/28/2016 (Not Clinical)    Override on 6/28/2016: Not Clinically Appropriate        No orders of the defined types were placed in this encounter.    The following information is provided to all patients.  This information is to help you find resources for any of the problems found today that may be affecting your health:                Living healthy guide: www.[facility].[place].gov      Understanding Diabetes: www.diabetes.org      Eating healthy: www.cdc.gov/healthyweight      CDC home safety checklist: www.cdc.gov/steadi/patient.html      Agency on Aging: www.goea.[place].gov      Alcoholics anonymous (AA): www.aa.org      Physical Activity: www.[place].nih.gov/ci3tuik      Tobacco use: www.quitwithusla.org      Subjective assessment of current swallow function

## 2021-10-04 NOTE — SWALLOW BEDSIDE ASSESSMENT ADULT - COMMENTS
Pt refused trials Pt awake, confused, partially responsive to simple queries in Mexican ( Maira, ID#[id number]), HOB elevated to 90°.

## 2021-10-04 NOTE — DISCHARGE NOTE PROVIDER - CARE PROVIDER_API CALL
Patient is at 17w2d. Doing well. No cramping, no LOF, no VB  Some nausea, no vomiting.  Patients' weight gain, fluid intake and exercise level discussed.Vitals, fundal height , fetal position, and FHR reviewed on flowsheet.    .../70 mmHg  Wt 90.719 kg (200 lb)  LMP 10/31/2016  Past Medical History   Diagnosis Date   • Migraine    • H/O bladder infections      Patient Active Problem List    Diagnosis Date Noted   • Chlamydia infection- s/p tx- [facility] next visit 2017   • Encounter for supervision of normal first pregnancy in first trimester 2017   • Increased BMI - 40.6 2017     Lab:No results found for this or any previous visit (from the past 336 hour(s)).    Assessment: 25 year old   1  17w2d  2. Doing well  3. Size equals Dates and/or Scan  4. Weight gain: normal: Yes, excessive:No                   Plan:  1. Rediscuss diet.  2. Increase water intake   3. Continue vitamins.  4. appt with DR Ybarra  5. [facility] for chlamydia next visit  6. OB ff-up 4 weeks  
Pt here today for OB follow up, good fetal movement, denies LOF, VB. Pt c/o N/V and constipation. Has apt with Dr Ybarra on 3/23/17.   
RAMESH THAKUR  Geriatric Medicine-Internal Medicine  [address] [place] [address]  Phone: [phone]  Fax: [phone]  Follow Up Time:

## 2021-10-04 NOTE — SWALLOW BEDSIDE ASSESSMENT ADULT - CONSISTENCIES ADMINISTERED
ice chips, x1 thin liquid food from tray (carrots)/[facility] soft water, x2 cup sips/nectar thick applesauce, x2 tsp/puree

## 2021-10-04 NOTE — DISCHARGE NOTE PROVIDER - NSDCMRMEDTOKEN_GEN_ALL_CORE_FT
amLODIPine 5 mg oral tablet: 1 tab(s) orally once a day  docusate potassium 100 mg oral capsule: 1 cap(s) orally 3 times a day  irbesartan 75 mg oral tablet: 1 tab(s) orally once a day  memantine 5 mg oral tablet: 1 tab(s) orally 2 times a day  propranolol 20 mg oral tablet: 1 tab(s) orally 2 times a day  risperiDONE 0.25 mg oral tablet: 1 tab(s) orally once a day (at bedtime)  Senna 8.6 mg oral tablet: 2 tab(s) orally once a day (at bedtime), As Needed  simvastatin 20 mg oral tablet: 1 tab(s) orally once a day (at bedtime)  Synthroid 25 mcg (0.025 mg) oral tablet: 1 tab(s) orally once a day  Vitamin B6 100 mg oral tablet: 1 tab(s) orally once a day  Xarelto 20 mg oral tablet: 1 tab(s) orally once a day (in the evening)   acetaminophen 325 mg oral tablet: 2 tab(s) orally every 6 hours, As needed, Pain  amLODIPine 5 mg oral tablet: 1 tab(s) orally once a day  docusate potassium 100 mg oral capsule: 1 cap(s) orally 3 times a day  irbesartan 75 mg oral tablet: 1 tab(s) orally once a day  memantine 5 mg oral tablet: 1 tab(s) orally 2 times a day  pantoprazole 40 mg oral delayed release tablet: 1 tab(s) orally once a day (before a meal)  propranolol 20 mg oral tablet: 1 tab(s) orally 2 times a day  risperiDONE 0.25 mg oral tablet: 1 tab(s) orally once a day (at bedtime)  Senna 8.6 mg oral tablet: 2 tab(s) orally once a day (at bedtime), As Needed  simvastatin 20 mg oral tablet: 1 tab(s) orally once a day (at bedtime)  Synthroid 25 mcg (0.025 mg) oral tablet: 1 tab(s) orally once a day  Vitamin B6 100 mg oral tablet: 1 tab(s) orally once a day  Xarelto 20 mg oral tablet: 1 tab(s) orally once a day (in the evening)   acetaminophen 325 mg oral tablet: 2 tab(s) orally every 6 hours, As needed, Pain  amLODIPine 5 mg oral tablet: 1 tab(s) orally once a day  docusate potassium 100 mg oral capsule: 1 cap(s) orally 3 times a day  irbesartan 75 mg oral tablet: 1 tab(s) orally once a day  memantine 5 mg oral tablet: 1 tab(s) orally 2 times a day  pantoprazole 40 mg oral delayed release tablet: 1 tab(s) orally once a day (before a meal)  risperiDONE 0.25 mg oral tablet: 1 tab(s) orally once a day (at bedtime)  Senna 8.6 mg oral tablet: 2 tab(s) orally once a day (at bedtime), As Needed  simvastatin 20 mg oral tablet: 1 tab(s) orally once a day (at bedtime)  Synthroid 25 mcg (0.025 mg) oral tablet: 1 tab(s) orally once a day  Vitamin B6 100 mg oral tablet: 1 tab(s) orally once a day  Xarelto 20 mg oral tablet: 1 tab(s) orally once a day (in the evening)

## 2021-10-05 LAB
ANION GAP SERPL CALC-SCNC: 8 MMOL/L — SIGNIFICANT CHANGE UP (ref 5–17)
BASOPHILS # BLD AUTO: 0.04 K/UL — SIGNIFICANT CHANGE UP (ref 0–0.2)
BASOPHILS NFR BLD AUTO: 0.4 % — SIGNIFICANT CHANGE UP (ref 0–2)
BUN SERPL-MCNC: 43 MG/DL — HIGH (ref 7–18)
CALCIUM SERPL-MCNC: 9.8 MG/DL — SIGNIFICANT CHANGE UP (ref 8.4–10.5)
CHLORIDE SERPL-SCNC: 108 MMOL/L — SIGNIFICANT CHANGE UP (ref 96–108)
CO2 SERPL-SCNC: 27 MMOL/L — SIGNIFICANT CHANGE UP (ref 22–31)
CREAT SERPL-MCNC: 1.16 MG/DL — SIGNIFICANT CHANGE UP (ref 0.5–1.3)
EOSINOPHIL # BLD AUTO: 0.07 K/UL — SIGNIFICANT CHANGE UP (ref 0–0.5)
EOSINOPHIL NFR BLD AUTO: 0.6 % — SIGNIFICANT CHANGE UP (ref 0–6)
GLUCOSE SERPL-MCNC: 119 MG/DL — HIGH (ref 70–99)
HCT VFR BLD CALC: 36.4 % — SIGNIFICANT CHANGE UP (ref 34.5–45)
HGB BLD-MCNC: 12.8 G/DL — SIGNIFICANT CHANGE UP (ref 11.5–15.5)
IMM GRANULOCYTES NFR BLD AUTO: 0.6 % — SIGNIFICANT CHANGE UP (ref 0–1.5)
LYMPHOCYTES # BLD AUTO: 1.74 K/UL — SIGNIFICANT CHANGE UP (ref 1–3.3)
LYMPHOCYTES # BLD AUTO: 16.1 % — SIGNIFICANT CHANGE UP (ref 13–44)
MCHC RBC-ENTMCNC: 30.7 PG — SIGNIFICANT CHANGE UP (ref 27–34)
MCHC RBC-ENTMCNC: 35.2 GM/DL — SIGNIFICANT CHANGE UP (ref 32–36)
MCV RBC AUTO: 87.3 FL — SIGNIFICANT CHANGE UP (ref 80–100)
MONOCYTES # BLD AUTO: 0.95 K/UL — HIGH (ref 0–0.9)
MONOCYTES NFR BLD AUTO: 8.8 % — SIGNIFICANT CHANGE UP (ref 2–14)
NEUTROPHILS # BLD AUTO: 7.93 K/UL — HIGH (ref 1.8–7.4)
NEUTROPHILS NFR BLD AUTO: 73.5 % — SIGNIFICANT CHANGE UP (ref 43–77)
NRBC # BLD: 0 /100 WBCS — SIGNIFICANT CHANGE UP (ref 0–0)
PHOSPHATE SERPL-MCNC: 4.3 MG/DL — SIGNIFICANT CHANGE UP (ref 2.5–4.5)
PLATELET # BLD AUTO: 308 K/UL — SIGNIFICANT CHANGE UP (ref 150–400)
POTASSIUM SERPL-MCNC: 3.9 MMOL/L — SIGNIFICANT CHANGE UP (ref 3.5–5.3)
POTASSIUM SERPL-SCNC: 3.9 MMOL/L — SIGNIFICANT CHANGE UP (ref 3.5–5.3)
RBC # BLD: 4.17 M/UL — SIGNIFICANT CHANGE UP (ref 3.8–5.2)
RBC # FLD: 13.1 % — SIGNIFICANT CHANGE UP (ref 10.3–14.5)
SARS-COV-2 RNA SPEC QL NAA+PROBE: SIGNIFICANT CHANGE UP
SODIUM SERPL-SCNC: 143 MMOL/L — SIGNIFICANT CHANGE UP (ref 135–145)
WBC # BLD: 10.8 K/UL — HIGH (ref 3.8–10.5)
WBC # FLD AUTO: 10.8 K/UL — HIGH (ref 3.8–10.5)

## 2021-10-05 RX ADMIN — SENNA PLUS 2 TABLET(S): 8.6 TABLET ORAL at 21:43

## 2021-10-05 RX ADMIN — CEFTRIAXONE 100 MILLIGRAM(S): 500 INJECTION, POWDER, FOR SOLUTION INTRAMUSCULAR; INTRAVENOUS at 11:59

## 2021-10-05 RX ADMIN — LOSARTAN POTASSIUM 25 MILLIGRAM(S): 100 TABLET, FILM COATED ORAL at 05:48

## 2021-10-05 RX ADMIN — SIMVASTATIN 20 MILLIGRAM(S): 20 TABLET, FILM COATED ORAL at 21:43

## 2021-10-05 RX ADMIN — PANTOPRAZOLE SODIUM 40 MILLIGRAM(S): 20 TABLET, DELAYED RELEASE ORAL at 05:48

## 2021-10-05 RX ADMIN — Medication 100 MILLIGRAM(S): at 11:59

## 2021-10-05 RX ADMIN — POLYETHYLENE GLYCOL 3350 17 GRAM(S): 17 POWDER, FOR SOLUTION ORAL at 12:00

## 2021-10-05 RX ADMIN — MEMANTINE HYDROCHLORIDE 5 MILLIGRAM(S): 10 TABLET ORAL at 05:48

## 2021-10-05 RX ADMIN — RIVAROXABAN 20 MILLIGRAM(S): KIT at 14:34

## 2021-10-05 RX ADMIN — RISPERIDONE 0.25 MILLIGRAM(S): 4 TABLET ORAL at 21:43

## 2021-10-05 RX ADMIN — Medication 25 MICROGRAM(S): at 05:48

## 2021-10-05 RX ADMIN — MEMANTINE HYDROCHLORIDE 5 MILLIGRAM(S): 10 TABLET ORAL at 18:38

## 2021-10-05 RX ADMIN — AMLODIPINE BESYLATE 5 MILLIGRAM(S): 2.5 TABLET ORAL at 05:48

## 2021-10-05 NOTE — PROGRESS NOTE ADULT - ASSESSMENT
89 y/o F w/ Pmhx of Dementia, HLD, hypothyroidism, hypertension, PE on xarelto was BIBEMS as patient was having change in mental status for one day admitted to medicine for Acute encephalopathy 2/2 UTI; urine culture positive for Ecoli; continued on Rocephin pending sensitivities.  89 y/o F w/ Pmhx of Dementia, HLD, hypothyroidism, hypertension, PE on xarelto was BIBEMS as patient was having change in mental status for one day admitted to medicine for Acute encephalopathy 2/2 UTI; urine culture positive for Ecoli Rocephin D4. PT ramirez ramirez PAO, choices given to family. NCM to follow

## 2021-10-05 NOTE — PROGRESS NOTE ADULT - SUBJECTIVE AND OBJECTIVE BOX
NP Note discussed with  Primary Attending    INTERVAL HPI/OVERNIGHT EVENTS: S/P speech eval will advance diet      MEDICATIONS  (STANDING):  amLODIPine   Tablet 5 milliGRAM(s) Oral daily  cefTRIAXone   IVPB 1000 milliGRAM(s) IV Intermittent every 24 hours  influenza   Vaccine 0.5 milliLiter(s) IntraMuscular once  levothyroxine 25 MICROGram(s) Oral daily  losartan 25 milliGRAM(s) Oral daily  memantine 5 milliGRAM(s) Oral two times a day  pantoprazole    Tablet 40 milliGRAM(s) Oral before breakfast  polyethylene glycol 3350 17 Gram(s) Oral daily  pyridoxine 100 milliGRAM(s) Oral daily  risperiDONE   Tablet 0.25 milliGRAM(s) Oral at bedtime  rivaroxaban 20 milliGRAM(s) Oral daily  senna 2 Tablet(s) Oral at bedtime  simvastatin 20 milliGRAM(s) Oral at bedtime    MEDICATIONS  (PRN):  acetaminophen   Tablet .. 650 milliGRAM(s) Oral every 6 hours PRN Temp greater or equal to 38C (100.4F), Mild Pain (1 - 3)      __________________________________________________  REVIEW OF SYSTEMS:    CONSTITUTIONAL: No fever,   EYES: no acute visual disturbances  NECK: No pain or stiffness  RESPIRATORY: No cough; No shortness of breath  CARDIOVASCULAR: No chest pain, no palpitations  GASTROINTESTINAL: No pain. No nausea or vomiting; No diarrhea   NEUROLOGICAL: No headache or numbness, no tremors  MUSCULOSKELETAL: No joint pain, no muscle pain  GENITOURINARY: no dysuria, no frequency, no hesitancy  PSYCHIATRY: no depression , no anxiety  ALL OTHER  ROS negative        Vital Signs Last 24 Hrs  T(C): 36.7 (05 Oct 2021 05:31), Max: 36.8 (04 Oct 2021 21:44)  T(F): 98 (05 Oct 2021 05:31), Max: 98.2 (04 Oct 2021 21:44)  HR: 96 (05 Oct 2021 05:31) (96 - 103)  BP: 120/73 (05 Oct 2021 05:31) (115/72 - 128/85)  BP(mean): --  RR: 19 (05 Oct 2021 05:31) (18 - 19)  SpO2: 99% (05 Oct 2021 05:31) (97% - 99%)    ________________________________________________  PHYSICAL EXAM:  GENERAL: NAD soloment   HEENT: Normocephalic; atraumatic   CHEST/LUNG: Breathing nonlabored; clear to auscultation bilaterally  HEART: +S1 +S2  regular  ABDOMEN: Soft, Nontender, Nondistended; Bowel sounds present  EXTREMITIES: + 2 bilateral radial pulses. No edema  SKIN: warm and dry; no rash  NERVOUS SYSTEM:  A&ox2     _________________________________________________  LABS:                        12.8   10.80 )-----------( 308      ( 05 Oct 2021 07:56 )             36.4     10-05    143  |  108  |  43<H>  ----------------------------<  119<H>  3.9   |  27  |  1.16    Ca    9.8      05 Oct 2021 07:56  Phos  4.3     10-05  Mg     1.8     10-04          CAPILLARY BLOOD GLUCOSE            RADIOLOGY & ADDITIONAL TESTS:   < from: Xray Chest 1 View- PORTABLE-Urgent (10.01.21 @ 13:34) >    EXAM:  XR CHEST PORTABLE URGENT 1V                            PROCEDURE DATE:  10/01/2021          INTERPRETATION:  Portable chest radiograph    CLINICAL INFORMATION: Sepsis    TECHNIQUE:  Portable  AP view of the chest.    COMPARISON: No previous examinations are available for review.    FINDINGS:    The lungs are clear of airspace consolidations or effusions. No pneumothorax.    The  heart is mildly enlarged in transverse diameter. No hilar mass.     Visualized osseous structures are intact.    IMPRESSION:   No radiographic evidence of active chest disease.    --- End of Report ---    < end of copied text >  < from: CT Head No Cont (10.01.21 @ 12:36) >    EXAM:  CT BRAIN                            PROCEDURE DATE:  10/01/2021          INTERPRETATION:  Noncontrast CT of the brain.    CLINICAL INDICATION:  altered mental status    TECHNIQUE : Axial CT scanning of the brain was obtained from the skull base to the vertex without the administration of intravenous contrast. Sagittal and coronal reformats were provided.    COMPARISON: None available    FINDINGS:    No hydrocephalus, mass effect, midline shift, acute intracranial hemorrhage, or brain edema. Mild to moderate  white matter microvascular ischemic disease.    No displaced calvarial fracture.    Right posterior ethmoid sinus mucosal thickening. Small right sphenoid sinus air-fluid level. Mastoid air cells clear.    IMPRESSION:    No hydrocephalus, acute intracranial hemorrhage, mass effect, or brain edema.  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    Small air-fluid level in the right sphenoid sinus, correlate for the presence of acute sinusitis.        --- End ofReport ---    < end of copied text >    Imaging Personally Reviewed:  YES    Consultant(s) Notes Reviewed:   YES       Plan of care was discussed with patient and /or primary care giver; all questions and concerns were addressed and care was aligned with patient's wishes.     NP Note discussed with  Primary Attending    INTERVAL HPI/OVERNIGHT EVENTS: S/P speech eval plan advance diet to clears     MEDICATIONS  (STANDING):  amLODIPine   Tablet 5 milliGRAM(s) Oral daily  cefTRIAXone   IVPB 1000 milliGRAM(s) IV Intermittent every 24 hours  influenza   Vaccine 0.5 milliLiter(s) IntraMuscular once  levothyroxine 25 MICROGram(s) Oral daily  losartan 25 milliGRAM(s) Oral daily  memantine 5 milliGRAM(s) Oral two times a day  pantoprazole    Tablet 40 milliGRAM(s) Oral before breakfast  polyethylene glycol 3350 17 Gram(s) Oral daily  pyridoxine 100 milliGRAM(s) Oral daily  risperiDONE   Tablet 0.25 milliGRAM(s) Oral at bedtime  rivaroxaban 20 milliGRAM(s) Oral daily  senna 2 Tablet(s) Oral at bedtime  simvastatin 20 milliGRAM(s) Oral at bedtime    MEDICATIONS  (PRN):  acetaminophen   Tablet .. 650 milliGRAM(s) Oral every 6 hours PRN Temp greater or equal to 38C (100.4F), Mild Pain (1 - 3)      __________________________________________________  REVIEW OF SYSTEMS:  limited due to mental status       Vital Signs Last 24 Hrs  T(C): 36.7 (05 Oct 2021 05:31), Max: 36.8 (04 Oct 2021 21:44)  T(F): 98 (05 Oct 2021 05:31), Max: 98.2 (04 Oct 2021 21:44)  HR: 96 (05 Oct 2021 05:31) (96 - 103)  BP: 120/73 (05 Oct 2021 05:31) (115/72 - 128/85)  BP(mean): --  RR: 19 (05 Oct 2021 05:31) (18 - 19)  SpO2: 99% (05 Oct 2021 05:31) (97% - 99%)    ________________________________________________  PHYSICAL EXAM:  GENERAL: NAD soloment   HEENT: Normocephalic; atraumatic   CHEST/LUNG: Breathing nonlabored; clear to auscultation bilaterally  HEART: +S1 +S2  regular  ABDOMEN: Soft, Nontender, Nondistended; Bowel sounds present  EXTREMITIES: + 2 bilateral radial pulses. No edema  SKIN: warm and dry; no rash  NERVOUS SYSTEM:  A&ox2     _________________________________________________  LABS:                        12.8   10.80 )-----------( 308      ( 05 Oct 2021 07:56 )             36.4     10-05    143  |  108  |  43<H>  ----------------------------<  119<H>  3.9   |  27  |  1.16    Ca    9.8      05 Oct 2021 07:56  Phos  4.3     10-05  Mg     1.8     10-04          CAPILLARY BLOOD GLUCOSE            RADIOLOGY & ADDITIONAL TESTS:   < from: Xray Chest 1 View- PORTABLE-Urgent (10.01.21 @ 13:34) >    EXAM:  XR CHEST PORTABLE URGENT 1V                            PROCEDURE DATE:  10/01/2021          INTERPRETATION:  Portable chest radiograph    CLINICAL INFORMATION: Sepsis    TECHNIQUE:  Portable  AP view of the chest.    COMPARISON: No previous examinations are available for review.    FINDINGS:    The lungs are clear of airspace consolidations or effusions. No pneumothorax.    The  heart is mildly enlarged in transverse diameter. No hilar mass.     Visualized osseous structures are intact.    IMPRESSION:   No radiographic evidence of active chest disease.    --- End of Report ---    < end of copied text >  < from: CT Head No Cont (10.01.21 @ 12:36) >    EXAM:  CT BRAIN                            PROCEDURE DATE:  10/01/2021          INTERPRETATION:  Noncontrast CT of the brain.    CLINICAL INDICATION:  altered mental status    TECHNIQUE : Axial CT scanning of the brain was obtained from the skull base to the vertex without the administration of intravenous contrast. Sagittal and coronal reformats were provided.    COMPARISON: None available    FINDINGS:    No hydrocephalus, mass effect, midline shift, acute intracranial hemorrhage, or brain edema. Mild to moderate  white matter microvascular ischemic disease.    No displaced calvarial fracture.    Right posterior ethmoid sinus mucosal thickening. Small right sphenoid sinus air-fluid level. Mastoid air cells clear.    IMPRESSION:    No hydrocephalus, acute intracranial hemorrhage, mass effect, or brain edema.  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    Small air-fluid level in the right sphenoid sinus, correlate for the presence of acute sinusitis.        --- End ofReport ---    < end of copied text >    Imaging Personally Reviewed:  YES    Consultant(s) Notes Reviewed:   YES       Plan of care was discussed with patient and /or primary care giver; all questions and concerns were addressed and care was aligned with patient's wishes.

## 2021-10-05 NOTE — PROGRESS NOTE ADULT - PROBLEM SELECTOR PLAN 2
- Patient presented with change in mental status and UA +, Ucx growing Ecoli   - Continue Rocephin  - Follow up final Ucx result - Patient presented with change in mental status and UA +, Ucx growing Ecoli   - Continue Rocephin

## 2021-10-05 NOTE — PROGRESS NOTE ADULT - PROBLEM SELECTOR PLAN 1
- Patient admitted for acute encephalopathy most likely 2/2 UTI vs worsening dementia   - CT Head showed No hydrocephalus, acute intracranial hemorrhage, mass effect, or brain edema. 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  - Fall precautions/Aspiration precautions  - Continue rocephin  forUTI  - Prelim blood culture NGTD  - Urine cx prelim Ecoli; pending sensitivities.   - Continue to monitor mental status, wbc, vitals - Patient admitted for acute encephalopathy most likely 2/2 UTI vs worsening dementia   - CT Head showed No hydrocephalus, acute intracranial hemorrhage, mass effect, or brain edema. 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  - Fall precautions/Aspiration precautions  - Continue rocephin  forUTI  -UCX +Ecoli  - blood culture NGTD  - Continue to monitor mental status, wbc, vitals

## 2021-10-05 NOTE — PROGRESS NOTE ADULT - PROBLEM SELECTOR PLAN 8
Pending Urine culture sensitivities; d/w core lab final result expected at approx 10 pm tonight  PT recommend PAO   Covid negative 10/1 UCx +Ecoli  Continue Rocephin   PT recommend PAO   Covid negative 10/1  pending repeat 10/5   Choices given to family  NCM to follow

## 2021-10-06 DIAGNOSIS — N39.0 URINARY TRACT INFECTION, SITE NOT SPECIFIED: ICD-10-CM

## 2021-10-06 LAB
ANION GAP SERPL CALC-SCNC: 10 MMOL/L — SIGNIFICANT CHANGE UP (ref 5–17)
BUN SERPL-MCNC: 56 MG/DL — HIGH (ref 7–18)
CALCIUM SERPL-MCNC: 9.6 MG/DL — SIGNIFICANT CHANGE UP (ref 8.4–10.5)
CHLORIDE SERPL-SCNC: 107 MMOL/L — SIGNIFICANT CHANGE UP (ref 96–108)
CO2 SERPL-SCNC: 26 MMOL/L — SIGNIFICANT CHANGE UP (ref 22–31)
CREAT SERPL-MCNC: 1.26 MG/DL — SIGNIFICANT CHANGE UP (ref 0.5–1.3)
CULTURE RESULTS: SIGNIFICANT CHANGE UP
CULTURE RESULTS: SIGNIFICANT CHANGE UP
GLUCOSE SERPL-MCNC: 122 MG/DL — HIGH (ref 70–99)
HCT VFR BLD CALC: 36.3 % — SIGNIFICANT CHANGE UP (ref 34.5–45)
HGB BLD-MCNC: 12.7 G/DL — SIGNIFICANT CHANGE UP (ref 11.5–15.5)
MCHC RBC-ENTMCNC: 29.9 PG — SIGNIFICANT CHANGE UP (ref 27–34)
MCHC RBC-ENTMCNC: 35 GM/DL — SIGNIFICANT CHANGE UP (ref 32–36)
MCV RBC AUTO: 85.4 FL — SIGNIFICANT CHANGE UP (ref 80–100)
NRBC # BLD: 0 /100 WBCS — SIGNIFICANT CHANGE UP (ref 0–0)
PLATELET # BLD AUTO: 318 K/UL — SIGNIFICANT CHANGE UP (ref 150–400)
POTASSIUM SERPL-MCNC: 3.8 MMOL/L — SIGNIFICANT CHANGE UP (ref 3.5–5.3)
POTASSIUM SERPL-SCNC: 3.8 MMOL/L — SIGNIFICANT CHANGE UP (ref 3.5–5.3)
RBC # BLD: 4.25 M/UL — SIGNIFICANT CHANGE UP (ref 3.8–5.2)
RBC # FLD: 13.2 % — SIGNIFICANT CHANGE UP (ref 10.3–14.5)
SODIUM SERPL-SCNC: 143 MMOL/L — SIGNIFICANT CHANGE UP (ref 135–145)
SPECIMEN SOURCE: SIGNIFICANT CHANGE UP
SPECIMEN SOURCE: SIGNIFICANT CHANGE UP
WBC # BLD: 7.45 K/UL — SIGNIFICANT CHANGE UP (ref 3.8–10.5)
WBC # FLD AUTO: 7.45 K/UL — SIGNIFICANT CHANGE UP (ref 3.8–10.5)

## 2021-10-06 PROCEDURE — 99232 SBSQ HOSP IP/OBS MODERATE 35: CPT

## 2021-10-06 RX ORDER — PROPRANOLOL HCL 160 MG
1 CAPSULE, EXTENDED RELEASE 24HR ORAL
Qty: 0 | Refills: 0 | DISCHARGE

## 2021-10-06 RX ORDER — PANTOPRAZOLE SODIUM 20 MG/1
1 TABLET, DELAYED RELEASE ORAL
Qty: 0 | Refills: 0 | DISCHARGE
Start: 2021-10-06

## 2021-10-06 RX ORDER — ACETAMINOPHEN 500 MG
2 TABLET ORAL
Qty: 0 | Refills: 0 | DISCHARGE
Start: 2021-10-06

## 2021-10-06 RX ADMIN — SIMVASTATIN 20 MILLIGRAM(S): 20 TABLET, FILM COATED ORAL at 21:59

## 2021-10-06 RX ADMIN — Medication 25 MICROGRAM(S): at 05:39

## 2021-10-06 RX ADMIN — Medication 100 MILLIGRAM(S): at 11:26

## 2021-10-06 RX ADMIN — AMLODIPINE BESYLATE 5 MILLIGRAM(S): 2.5 TABLET ORAL at 05:39

## 2021-10-06 RX ADMIN — MEMANTINE HYDROCHLORIDE 5 MILLIGRAM(S): 10 TABLET ORAL at 17:07

## 2021-10-06 RX ADMIN — SENNA PLUS 2 TABLET(S): 8.6 TABLET ORAL at 21:58

## 2021-10-06 RX ADMIN — INFLUENZA VIRUS VACCINE 0.5 MILLILITER(S): 15; 15; 15; 15 SUSPENSION INTRAMUSCULAR at 12:23

## 2021-10-06 RX ADMIN — RISPERIDONE 0.25 MILLIGRAM(S): 4 TABLET ORAL at 21:58

## 2021-10-06 RX ADMIN — POLYETHYLENE GLYCOL 3350 17 GRAM(S): 17 POWDER, FOR SOLUTION ORAL at 11:26

## 2021-10-06 RX ADMIN — RIVAROXABAN 20 MILLIGRAM(S): KIT at 11:26

## 2021-10-06 RX ADMIN — MEMANTINE HYDROCHLORIDE 5 MILLIGRAM(S): 10 TABLET ORAL at 05:39

## 2021-10-06 RX ADMIN — LOSARTAN POTASSIUM 25 MILLIGRAM(S): 100 TABLET, FILM COATED ORAL at 05:39

## 2021-10-06 RX ADMIN — CEFTRIAXONE 100 MILLIGRAM(S): 500 INJECTION, POWDER, FOR SOLUTION INTRAMUSCULAR; INTRAVENOUS at 11:26

## 2021-10-06 RX ADMIN — PANTOPRAZOLE SODIUM 40 MILLIGRAM(S): 20 TABLET, DELAYED RELEASE ORAL at 05:39

## 2021-10-06 NOTE — PROGRESS NOTE ADULT - PROBLEM SELECTOR PLAN 3
TSH wnl  - Continue home synthroid dose

## 2021-10-06 NOTE — PROGRESS NOTE ADULT - REASON FOR ADMISSION
ACUTE ENCEPHALOPATHY

## 2021-10-06 NOTE — PROGRESS NOTE ADULT - PROBLEM SELECTOR PLAN 8
PT recommend PAO ; CM following. Accepted to [place]. Pending insurance authorization. Likely d/c tomorrow.   last Covid negative 10/5

## 2021-10-06 NOTE — PROGRESS NOTE ADULT - PROBLEM SELECTOR PLAN 4
Controlled. Patient has history of Hypertension on Irbesartan and amlodipine at home   - C/w home meds with parameters- losartan is equivalent to Irbesartan   - DASH diet  -Continue to monitor BP

## 2021-10-06 NOTE — PROGRESS NOTE ADULT - PROBLEM SELECTOR PLAN 6
hx of PE on Xarelto;   - Continue Xarelto  - Maintain bleeding precautions.

## 2021-10-06 NOTE — PROGRESS NOTE ADULT - SUBJECTIVE AND OBJECTIVE BOX
NP Note discussed with  Primary Attending; Dr Madsen    Patient is a 89y old  Female who presents with a chief complaint of ACUTE ENCEPHALOPATHY (06 Oct 2021 16:07)    Patient is mainly Maldivian speaking, language line interpretation services, Agent [phone], assisted with translation.    INTERVAL HPI/OVERNIGHT EVENTS: Patient seen and examined at bedside. A&Ox1-2 in no acute distress.     MEDICATIONS  (STANDING):  amLODIPine   Tablet 5 milliGRAM(s) Oral daily  cefTRIAXone   IVPB 1000 milliGRAM(s) IV Intermittent every 24 hours  levothyroxine 25 MICROGram(s) Oral daily  losartan 25 milliGRAM(s) Oral daily  memantine 5 milliGRAM(s) Oral two times a day  pantoprazole    Tablet 40 milliGRAM(s) Oral before breakfast  polyethylene glycol 3350 17 Gram(s) Oral daily  pyridoxine 100 milliGRAM(s) Oral daily  risperiDONE   Tablet 0.25 milliGRAM(s) Oral at bedtime  rivaroxaban 20 milliGRAM(s) Oral daily  senna 2 Tablet(s) Oral at bedtime  simvastatin 20 milliGRAM(s) Oral at bedtime    MEDICATIONS  (PRN):  acetaminophen   Tablet .. 650 milliGRAM(s) Oral every 6 hours PRN Temp greater or equal to 38C (100.4F), Mild Pain (1 - 3)      __________________________________________________  REVIEW OF SYSTEMS: limited given current mental status.     Vital Signs Last 24 Hrs  T(C): 36.9 (06 Oct 2021 14:22), Max: 36.9 (06 Oct 2021 04:55)  T(F): 98.4 (06 Oct 2021 14:22), Max: 98.5 (06 Oct 2021 04:55)  HR: 106 (06 Oct 2021 14:22) (81 - 106)  BP: 120/76 (06 Oct 2021 14:22) (103/72 - 120/76)  BP(mean): --  RR: 20 (06 Oct 2021 14:22) (19 - 20)  SpO2: 97% (06 Oct 2021 14:22) (97% - 100%)    ________________________________________________  PHYSICAL EXAM:  GENERAL: NAD  HEENT: Normocephalic; atraumatic  CHEST/LUNG: Breathing nonlabored; limited as patient does not fully follow command to deep breathe otherwise clear.   HEART: +S1 +S2  regular  ABDOMEN: Soft, Nontender, Nondistended; Bowel sounds present  EXTREMITIES: no cyanosis; no edema; no calf tenderness  SKIN: warm and dry; no rash  NERVOUS SYSTEM:  Awake and alert; Oriented  to person, states she is in a doctor's office and would like to go home. no new deficits    _________________________________________________  LABS:                        12.7   7.45  )-----------( 318      ( 06 Oct 2021 07:01 )             36.3     10-06    143  |  107  |  56<H>  ----------------------------<  122<H>  3.8   |  26  |  1.26    Ca    9.6      06 Oct 2021 07:01  Phos  4.3     10-05            RADIOLOGY & ADDITIONAL TESTS:    Culture - Blood (10.01.21 @ 19:04)    Specimen Source: .Blood Blood-Peripheral    Culture Results:   No growth to date.    Culture - Urine (10.01.21 @ 18:57)    -  Amikacin: S <=16    -  Amoxicillin/Clavulanic Acid: S <=8/4    -  Ampicillin: R >16 These ampicillin results predict results for amoxicillin    -  Ampicillin/Sulbactam: S 8/4 Enterobacter, Citrobacter, and Serratia may develop resistance during prolonged therapy (3-4 days)    -  Aztreonam: S <=4    -  Cefazolin: S <=2 (MIC_CL_COM_ENTERIC_CEFAZU) For uncomplicated UTI with K. pneumoniae, E. coli, or P. mirablis: BRYAN <=16 is sensitive and BRYAN >=32 is resistant. This also predicts results for oral agents cefaclor, cefdinir, cefpodoxime, cefprozil, cefuroxime axetil, cephalexin and locarbef for uncomplicated UTI. Note that some isolates may be susceptible to these agents while testing resistant to cefazolin.    -  Cefepime: S <=2    -  Cefoxitin: S <=8    -  Ceftriaxone: S <=1 Enterobacter, Citrobacter, and Serratia may develop resistance during prolonged therapy    -  Ciprofloxacin: R >2    -  Ertapenem: S <=0.5    -  Gentamicin: R >8    -  Imipenem: S <=1    -  Levofloxacin: R >4    -  Meropenem: S <=1    -  Nitrofurantoin: S <=32 Should not be used to treat pyelonephritis    -  Piperacillin/Tazobactam: S <=8    -  Tigecycline: S <=2    -  Tobramycin: I 8    -  Trimethoprim/Sulfamethoxazole: S <=0.5/9.5    Specimen Source: Clean Catch Clean Catch (Midstream)    Culture Results:   >100,000 CFU/ml Escherichia coli    Organism Identification: Escherichia coli    Organism: Escherichia coli    Method Type: BRYAN

## 2021-10-06 NOTE — PROGRESS NOTE ADULT - PROBLEM SELECTOR PLAN 1
- Patient admitted for acute encephalopathy most likely 2/2 UTI vs worsening dementia   - CT Head showed No hydrocephalus, acute intracranial hemorrhage, mass effect, or brain edema. 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  - Fall precautions/Aspiration precautions  - Continue rocephin  for UTI  - Prelim blood culture NGTD  - Urine cx prelim Ecoli sensitive to Rocephin    - Continue to monitor mental status, wbc, vitals

## 2021-10-07 VITALS — WEIGHT: 125 LBS

## 2021-10-07 LAB
ANION GAP SERPL CALC-SCNC: 8 MMOL/L — SIGNIFICANT CHANGE UP (ref 5–17)
BASOPHILS # BLD AUTO: 0.03 K/UL — SIGNIFICANT CHANGE UP (ref 0–0.2)
BASOPHILS NFR BLD AUTO: 0.6 % — SIGNIFICANT CHANGE UP (ref 0–2)
BUN SERPL-MCNC: 57 MG/DL — HIGH (ref 7–18)
CALCIUM SERPL-MCNC: 9.6 MG/DL — SIGNIFICANT CHANGE UP (ref 8.4–10.5)
CHLORIDE SERPL-SCNC: 110 MMOL/L — HIGH (ref 96–108)
CO2 SERPL-SCNC: 26 MMOL/L — SIGNIFICANT CHANGE UP (ref 22–31)
CREAT SERPL-MCNC: 1.07 MG/DL — SIGNIFICANT CHANGE UP (ref 0.5–1.3)
EOSINOPHIL # BLD AUTO: 0 K/UL — SIGNIFICANT CHANGE UP (ref 0–0.5)
EOSINOPHIL NFR BLD AUTO: 0 % — SIGNIFICANT CHANGE UP (ref 0–6)
GLUCOSE SERPL-MCNC: 114 MG/DL — HIGH (ref 70–99)
HCT VFR BLD CALC: 34.3 % — LOW (ref 34.5–45)
HGB BLD-MCNC: 11.6 G/DL — SIGNIFICANT CHANGE UP (ref 11.5–15.5)
IMM GRANULOCYTES NFR BLD AUTO: 0.4 % — SIGNIFICANT CHANGE UP (ref 0–1.5)
LYMPHOCYTES # BLD AUTO: 0.86 K/UL — LOW (ref 1–3.3)
LYMPHOCYTES # BLD AUTO: 18.2 % — SIGNIFICANT CHANGE UP (ref 13–44)
MCHC RBC-ENTMCNC: 30 PG — SIGNIFICANT CHANGE UP (ref 27–34)
MCHC RBC-ENTMCNC: 33.8 GM/DL — SIGNIFICANT CHANGE UP (ref 32–36)
MCV RBC AUTO: 88.6 FL — SIGNIFICANT CHANGE UP (ref 80–100)
MONOCYTES # BLD AUTO: 0.75 K/UL — SIGNIFICANT CHANGE UP (ref 0–0.9)
MONOCYTES NFR BLD AUTO: 15.9 % — HIGH (ref 2–14)
NEUTROPHILS # BLD AUTO: 3.07 K/UL — SIGNIFICANT CHANGE UP (ref 1.8–7.4)
NEUTROPHILS NFR BLD AUTO: 64.9 % — SIGNIFICANT CHANGE UP (ref 43–77)
NRBC # BLD: 0 /100 WBCS — SIGNIFICANT CHANGE UP (ref 0–0)
PLATELET # BLD AUTO: 293 K/UL — SIGNIFICANT CHANGE UP (ref 150–400)
POTASSIUM SERPL-MCNC: 4.1 MMOL/L — SIGNIFICANT CHANGE UP (ref 3.5–5.3)
POTASSIUM SERPL-SCNC: 4.1 MMOL/L — SIGNIFICANT CHANGE UP (ref 3.5–5.3)
RBC # BLD: 3.87 M/UL — SIGNIFICANT CHANGE UP (ref 3.8–5.2)
RBC # FLD: 12.8 % — SIGNIFICANT CHANGE UP (ref 10.3–14.5)
SODIUM SERPL-SCNC: 144 MMOL/L — SIGNIFICANT CHANGE UP (ref 135–145)
WBC # BLD: 4.73 K/UL — SIGNIFICANT CHANGE UP (ref 3.8–10.5)
WBC # FLD AUTO: 4.73 K/UL — SIGNIFICANT CHANGE UP (ref 3.8–10.5)

## 2021-10-07 PROCEDURE — 82962 GLUCOSE BLOOD TEST: CPT

## 2021-10-07 PROCEDURE — 85610 PROTHROMBIN TIME: CPT

## 2021-10-07 PROCEDURE — 84443 ASSAY THYROID STIM HORMONE: CPT

## 2021-10-07 PROCEDURE — 71045 X-RAY EXAM CHEST 1 VIEW: CPT

## 2021-10-07 PROCEDURE — 87077 CULTURE AEROBIC IDENTIFY: CPT

## 2021-10-07 PROCEDURE — 87635 SARS-COV-2 COVID-19 AMP PRB: CPT

## 2021-10-07 PROCEDURE — 87086 URINE CULTURE/COLONY COUNT: CPT

## 2021-10-07 PROCEDURE — 85027 COMPLETE CBC AUTOMATED: CPT

## 2021-10-07 PROCEDURE — 83605 ASSAY OF LACTIC ACID: CPT

## 2021-10-07 PROCEDURE — 93005 ELECTROCARDIOGRAM TRACING: CPT

## 2021-10-07 PROCEDURE — 0225U NFCT DS DNA&RNA 21 SARSCOV2: CPT

## 2021-10-07 PROCEDURE — 92610 EVALUATE SWALLOWING FUNCTION: CPT

## 2021-10-07 PROCEDURE — 86769 SARS-COV-2 COVID-19 ANTIBODY: CPT

## 2021-10-07 PROCEDURE — 80061 LIPID PANEL: CPT

## 2021-10-07 PROCEDURE — 97162 PT EVAL MOD COMPLEX 30 MIN: CPT

## 2021-10-07 PROCEDURE — 99285 EMERGENCY DEPT VISIT HI MDM: CPT

## 2021-10-07 PROCEDURE — 92526 ORAL FUNCTION THERAPY: CPT

## 2021-10-07 PROCEDURE — 83036 HEMOGLOBIN GLYCOSYLATED A1C: CPT

## 2021-10-07 PROCEDURE — 80048 BASIC METABOLIC PNL TOTAL CA: CPT

## 2021-10-07 PROCEDURE — 84100 ASSAY OF PHOSPHORUS: CPT

## 2021-10-07 PROCEDURE — G1004: CPT

## 2021-10-07 PROCEDURE — 36415 COLL VENOUS BLD VENIPUNCTURE: CPT

## 2021-10-07 PROCEDURE — 70450 CT HEAD/BRAIN W/O DYE: CPT | Mod: MG

## 2021-10-07 PROCEDURE — 87040 BLOOD CULTURE FOR BACTERIA: CPT

## 2021-10-07 PROCEDURE — 90686 IIV4 VACC NO PRSV 0.5 ML IM: CPT

## 2021-10-07 PROCEDURE — 85730 THROMBOPLASTIN TIME PARTIAL: CPT

## 2021-10-07 PROCEDURE — 81001 URINALYSIS AUTO W/SCOPE: CPT

## 2021-10-07 PROCEDURE — 85025 COMPLETE CBC W/AUTO DIFF WBC: CPT

## 2021-10-07 PROCEDURE — 83735 ASSAY OF MAGNESIUM: CPT

## 2021-10-07 PROCEDURE — 80053 COMPREHEN METABOLIC PANEL: CPT

## 2021-10-07 PROCEDURE — 87186 SC STD MICRODIL/AGAR DIL: CPT

## 2021-10-07 RX ADMIN — Medication 100 MILLIGRAM(S): at 11:16

## 2021-10-07 RX ADMIN — RIVAROXABAN 20 MILLIGRAM(S): KIT at 11:16

## 2021-10-07 RX ADMIN — PANTOPRAZOLE SODIUM 40 MILLIGRAM(S): 20 TABLET, DELAYED RELEASE ORAL at 06:01

## 2021-10-07 RX ADMIN — MEMANTINE HYDROCHLORIDE 5 MILLIGRAM(S): 10 TABLET ORAL at 05:06

## 2021-10-07 RX ADMIN — LOSARTAN POTASSIUM 25 MILLIGRAM(S): 100 TABLET, FILM COATED ORAL at 05:06

## 2021-10-07 RX ADMIN — Medication 25 MICROGRAM(S): at 05:06

## 2021-10-07 RX ADMIN — AMLODIPINE BESYLATE 5 MILLIGRAM(S): 2.5 TABLET ORAL at 05:06

## 2021-10-07 NOTE — DIETITIAN INITIAL EVALUATION ADULT. - PERTINENT LABORATORY DATA
10-07 Na144 mmol/L Glu 114 mg/dL<H> K+ 4.1 mmol/L Cr  1.07 mg/dL BUN 57 mg/dL<H>   10-05 Phos 4.3 mg/dL   10-01 Alb 3.1 g/dL<L>       10-02 Chol 167 mg/dL LDL --    HDL 54 mg/dL Trig 125 mg/dL  10-02-21 @ 11:58 HgbA1C 5.7 [4.0 - 5.6]

## 2021-10-07 NOTE — DIETITIAN INITIAL EVALUATION ADULT. - OTHER INFO
Pt visited. Pt is confused. Pt seen for LOS. D/W RN Pt is fed by staff. SLP note noted on  10/4-  deferred to Medical Team d/t unable to make diet recommendation d/t Refusal to take sufficient PO trial. D/W RN  Pt to be D/C to NH today.

## 2021-10-07 NOTE — DIETITIAN INITIAL EVALUATION ADULT. - PROBLEM SELECTOR PLAN 4
- Patient has history of Hypertension on Irbesartan and amlodipine at home   - C/w home meds with parameters- losartan is equivalent to Irbesartan   - DASH diet  - Monitor BP and adjust meds as needed

## 2021-10-07 NOTE — DISCHARGE NOTE NURSING/CASE MANAGEMENT/SOCIAL WORK - PATIENT PORTAL LINK FT
You can access the FollowMyHealth Patient Portal offered by [facility] by registering at the following website: [email or web address]. By joining [employer]’s FollowMyHealth portal, you will also be able to view your health information using other applications (apps) compatible with our system.

## 2021-10-07 NOTE — DIETITIAN INITIAL EVALUATION ADULT. - PERTINENT MEDS FT
MEDICATIONS:  acetaminophen   Tablet .. 650 every 6 hours PRN  amLODIPine   Tablet 5 daily  levothyroxine 25 daily  losartan 25 daily  memantine 5 two times a day  pantoprazole    Tablet 40 before breakfast  polyethylene glycol 3350 17 daily  pyridoxine 100 daily  risperiDONE   Tablet 0.25 at bedtime  rivaroxaban 20 daily  senna 2 at bedtime  simvastatin 20 at bedtime

## 2021-10-07 NOTE — DISCHARGE NOTE NURSING/CASE MANAGEMENT/SOCIAL WORK - NSDCVIVACCINE_GEN_ALL_CORE_FT
influenza, injectable, quadrivalent, preservative free; 06-Oct-2021 12:23; Key Krishnan (RN); [employer]; [id number] (Exp. Date: 30-Jun-2022); IntraMuscular; Deltoid Right.; 0.5 milliLiter(s); VIS (VIS Published: 15-Aug-2019, VIS Presented: 06-Oct-2021);

## 2022-06-22 NOTE — SWALLOW BEDSIDE ASSESSMENT ADULT - SLP REFERRING PHYSICIAN
[General Appearance - Well Developed] : well developed [General Appearance - Well Nourished] : well nourished [Normal Appearance] : normal appearance [Well Groomed] : well groomed [General Appearance - In No Acute Distress] : no acute distress [FreeTextEntry1] : obese [Skin Color & Pigmentation] : normal skin color and pigmentation [] : no respiratory distress [Respiration, Rhythm And Depth] : normal respiratory rhythm and effort [Exaggerated Use Of Accessory Muscles For Inspiration] : no accessory muscle use [Oriented To Time, Place, And Person] : oriented to person, place, and time [Affect] : the affect was normal [Mood] : the mood was normal [Not Anxious] : not anxious [Normal Station and Gait] : the gait and station were normal for the patient's age Tanvi Canseco

## 2022-09-15 NOTE — PROGRESS NOTE ADULT - ASSESSMENT
show 89 y/o F w/ Pmhx of Dementia, HLD, hypothyroidism, hypertension, PE on xarelto was BIBEMS as patient was having change in mental status for one day admitted to medicine for Acute encephalopathy 2/2 UTI; urine culture positive for Ecoli; continued on Rocephin pending sensitivities.

## 2024-06-06 NOTE — ED PROVIDER NOTE - NSTIMEPROVIDERCAREINITIATE_GEN_ER
[de-identified] : Impression: 5 weeks status post displaced fracture of the proximal humerus treated conservatively  Plan: Patient was advised to start with physical therapy, a prescription was given. Patient was advised for active assistive range of motion and mild gentle stretch, she was advised for weight bearing restriction to 1 pound to the right upper extremity. She was advised to start getting her medical records so she can take to [place].  Follow-up: 4 weeks with repeat x-ray of the right shoulder.  
01-Oct-2021 10:51

## 2025-02-11 NOTE — ED ADULT NURSE NOTE - CAS TRG GEN SKIN CONDITION
Received the following message. Closing encounter.    Anisa Fuller17 minutes ago (10:53 AM)     KACEY Baker,    I have reached out to the pt and have exhausted all communication efforts sent letter.      Liz Lee LPN     Warm